# Patient Record
Sex: FEMALE | Race: BLACK OR AFRICAN AMERICAN | NOT HISPANIC OR LATINO | URBAN - METROPOLITAN AREA
[De-identification: names, ages, dates, MRNs, and addresses within clinical notes are randomized per-mention and may not be internally consistent; named-entity substitution may affect disease eponyms.]

---

## 2019-07-09 ENCOUNTER — EMERGENCY (EMERGENCY)
Facility: HOSPITAL | Age: 69
LOS: 1 days | Discharge: HOME | End: 2019-07-09
Attending: EMERGENCY MEDICINE | Admitting: EMERGENCY MEDICINE
Payer: COMMERCIAL

## 2019-07-09 VITALS
OXYGEN SATURATION: 100 % | RESPIRATION RATE: 18 BRPM | HEART RATE: 79 BPM | DIASTOLIC BLOOD PRESSURE: 64 MMHG | SYSTOLIC BLOOD PRESSURE: 158 MMHG | TEMPERATURE: 98 F

## 2019-07-09 LAB
ALBUMIN SERPL ELPH-MCNC: 3.7 G/DL — SIGNIFICANT CHANGE UP (ref 3.3–5)
ALP SERPL-CCNC: 108 U/L — SIGNIFICANT CHANGE UP (ref 40–120)
ALT FLD-CCNC: 36 U/L — HIGH (ref 4–33)
ANION GAP SERPL CALC-SCNC: 16 MMO/L — HIGH (ref 7–14)
APTT BLD: 40.5 SEC — HIGH (ref 27.5–36.3)
AST SERPL-CCNC: 20 U/L — SIGNIFICANT CHANGE UP (ref 4–32)
BASOPHILS # BLD AUTO: 0.03 K/UL — SIGNIFICANT CHANGE UP (ref 0–0.2)
BASOPHILS NFR BLD AUTO: 0.6 % — SIGNIFICANT CHANGE UP (ref 0–2)
BILIRUB SERPL-MCNC: 0.4 MG/DL — SIGNIFICANT CHANGE UP (ref 0.2–1.2)
BUN SERPL-MCNC: 17 MG/DL — SIGNIFICANT CHANGE UP (ref 7–23)
CALCIUM SERPL-MCNC: 9.7 MG/DL — SIGNIFICANT CHANGE UP (ref 8.4–10.5)
CHLORIDE SERPL-SCNC: 104 MMOL/L — SIGNIFICANT CHANGE UP (ref 98–107)
CO2 SERPL-SCNC: 18 MMOL/L — LOW (ref 22–31)
CREAT SERPL-MCNC: 0.92 MG/DL — SIGNIFICANT CHANGE UP (ref 0.5–1.3)
EOSINOPHIL # BLD AUTO: 0.1 K/UL — SIGNIFICANT CHANGE UP (ref 0–0.5)
EOSINOPHIL NFR BLD AUTO: 1.9 % — SIGNIFICANT CHANGE UP (ref 0–6)
GLUCOSE SERPL-MCNC: 87 MG/DL — SIGNIFICANT CHANGE UP (ref 70–99)
HCT VFR BLD CALC: 44.6 % — SIGNIFICANT CHANGE UP (ref 34.5–45)
HGB BLD-MCNC: 14.5 G/DL — SIGNIFICANT CHANGE UP (ref 11.5–15.5)
IMM GRANULOCYTES NFR BLD AUTO: 0.2 % — SIGNIFICANT CHANGE UP (ref 0–1.5)
INR BLD: 1.31 — HIGH (ref 0.88–1.17)
LYMPHOCYTES # BLD AUTO: 2.54 K/UL — SIGNIFICANT CHANGE UP (ref 1–3.3)
LYMPHOCYTES # BLD AUTO: 47.7 % — HIGH (ref 13–44)
MCHC RBC-ENTMCNC: 29.1 PG — SIGNIFICANT CHANGE UP (ref 27–34)
MCHC RBC-ENTMCNC: 32.5 % — SIGNIFICANT CHANGE UP (ref 32–36)
MCV RBC AUTO: 89.6 FL — SIGNIFICANT CHANGE UP (ref 80–100)
MONOCYTES # BLD AUTO: 0.31 K/UL — SIGNIFICANT CHANGE UP (ref 0–0.9)
MONOCYTES NFR BLD AUTO: 5.8 % — SIGNIFICANT CHANGE UP (ref 2–14)
NEUTROPHILS # BLD AUTO: 2.33 K/UL — SIGNIFICANT CHANGE UP (ref 1.8–7.4)
NEUTROPHILS NFR BLD AUTO: 43.8 % — SIGNIFICANT CHANGE UP (ref 43–77)
NRBC # FLD: 0 K/UL — SIGNIFICANT CHANGE UP (ref 0–0)
NT-PROBNP SERPL-SCNC: 253.3 PG/ML — SIGNIFICANT CHANGE UP
PLATELET # BLD AUTO: 363 K/UL — SIGNIFICANT CHANGE UP (ref 150–400)
PMV BLD: 9 FL — SIGNIFICANT CHANGE UP (ref 7–13)
POTASSIUM SERPL-MCNC: 4.3 MMOL/L — SIGNIFICANT CHANGE UP (ref 3.5–5.3)
POTASSIUM SERPL-SCNC: 4.3 MMOL/L — SIGNIFICANT CHANGE UP (ref 3.5–5.3)
PROT SERPL-MCNC: 7.8 G/DL — SIGNIFICANT CHANGE UP (ref 6–8.3)
PROTHROM AB SERPL-ACNC: 15 SEC — HIGH (ref 9.8–13.1)
RBC # BLD: 4.98 M/UL — SIGNIFICANT CHANGE UP (ref 3.8–5.2)
RBC # FLD: 12.8 % — SIGNIFICANT CHANGE UP (ref 10.3–14.5)
SODIUM SERPL-SCNC: 138 MMOL/L — SIGNIFICANT CHANGE UP (ref 135–145)
TROPONIN T, HIGH SENSITIVITY: 11 NG/L — SIGNIFICANT CHANGE UP (ref ?–14)
TROPONIN T, HIGH SENSITIVITY: 11 NG/L — SIGNIFICANT CHANGE UP (ref ?–14)
WBC # BLD: 5.32 K/UL — SIGNIFICANT CHANGE UP (ref 3.8–10.5)
WBC # FLD AUTO: 5.32 K/UL — SIGNIFICANT CHANGE UP (ref 3.8–10.5)

## 2019-07-09 PROCEDURE — 93308 TTE F-UP OR LMTD: CPT | Mod: 26

## 2019-07-09 PROCEDURE — 93010 ELECTROCARDIOGRAM REPORT: CPT | Mod: NC

## 2019-07-09 PROCEDURE — 71045 X-RAY EXAM CHEST 1 VIEW: CPT | Mod: 26

## 2019-07-09 PROCEDURE — 99284 EMERGENCY DEPT VISIT MOD MDM: CPT | Mod: 25

## 2019-07-09 NOTE — ED PROVIDER NOTE - PHYSICAL EXAMINATION
GEN - NAD; well appearing; A+O x3   HEAD - NC/AT     EYES - EOMI, no conjunctival pallor, no scleral icterus  ENT -   mucous membranes  moist , no discharge      NECK - Neck supple  PULM - CTA b/l,  symmetric breath sounds  COR -  RRR, S1 S2, no murmurs  ABD - , ND, NT, soft, no guarding, no rebound, no masses    BACK - no CVA tenderness, nontender spine     EXTREMS - bilateral mild edema, R>L, no deformity, warm and well perfused    SKIN - no rash or bruising      NEUROLOGIC - alert, sensation nl, motor 5/5 RUE/LUE/RLE/LLE

## 2019-07-09 NOTE — ED PROVIDER NOTE - OBJECTIVE STATEMENT
68F s/p recent diagnosis of massive bilateral PE's, R leg DVT in [place] presents for further evaluation after coming back from [place] today. Pt reports that was feeling dyspnea and lightheadedness x 2 days, went to ED in [facility] on July 4th and was diagnose with PE/DVT, trop elevated as well, EKG with right heart strain, had IVC filter placed and started on Xarelto. Pt feels much better now, but would like to be further evaluated.  Pt does not believe she had an echo at OSH.

## 2019-07-09 NOTE — ED ADULT NURSE REASSESSMENT NOTE - NS ED NURSE REASSESS COMMENT FT1
pt is a&o x 3. pt stable and comfortable. pt denies sob, chest pain, n/v, fevers, chills at this time. will continue to monitor.

## 2019-07-09 NOTE — ED PROVIDER NOTE - ATTENDING CONTRIBUTION TO CARE
Dr. Ridley: I have personally performed a face to face bedside history and physical examination of this patient. I have discussed the history, examination, review of systems, assessment and plan of management with the resident. I have reviewed the electronic medical record and amended it to reflect my history, review of systems, physical exam, assessment and plan.      see chart

## 2019-07-09 NOTE — ED PROVIDER NOTE - PLAN OF CARE
68F s/p recent diagnosis of massive bilateral PE's, R leg DVT in [place] presents for further evaluation after coming back from [facility] today. Stable and asymptomatic.  -Continue anticoagulation with xarelto  -Follow up with PCP Dr. Serra  -Currently asymptomatic, denies dyspnea or CP, bedside US does not show RH strain.

## 2019-07-09 NOTE — ED ADULT TRIAGE NOTE - CHIEF COMPLAINT QUOTE
pt recently returned from [place] [facility], states that [facility] in [facility] she was short of breath, diagnosed with multiple pulmonary embolisms and dvts, was anticoagulated and IVC filters were placed on 7/4, pt was placed on xaralto. patient denies any chest pain or shortness of breath @ present, was told by pmd to come to the hospital to make sure "everything is ok."

## 2019-07-09 NOTE — ED ADULT NURSE NOTE - CHIEF COMPLAINT QUOTE
pt recently returned from [place] and [facility], states that while in [facility] she was short of breath, diagnosed with multiple pulmonary embolisms and dvts, was anticoagulated and IVC filters were placed on 7/4, pt was placed on xaralto. patient denies any chest pain or shortness of breath @ present, was told by pmd to come to the hospital to make sure "everything is ok."

## 2019-07-09 NOTE — ED PROVIDER NOTE - CARE PLAN
Principal Discharge DX:	Pulmonary embolism without acute cor pulmonale, unspecified chronicity, unspecified pulmonary embolism type  Assessment and plan of treatment:	68F s/p recent diagnosis of massive bilateral PE's, R leg DVT in [place] presents for further evaluation after coming back from [facility] today. Stable and asymptomatic.  -Continue anticoagulation with xarelto  -Follow up with PCP Dr. Serra  -Currently asymptomatic, denies dyspnea or CP, bedside US does not show RH strain.

## 2019-07-09 NOTE — ED PROVIDER NOTE - CLINICAL SUMMARY MEDICAL DECISION MAKING FREE TEXT BOX
Havmaiarabella: 68F with recent diagnosis of massive PE's, on Xarelto s/p IVC filter, feeling improved, hemodynamically stable. EKG shows right heart strain. will check trop, BNP to eval further for right heart strain, ACS. will r/o significant anemia. Plan for labs,  then likely admission for echo, cardiology eval.

## 2019-07-09 NOTE — ED PROVIDER NOTE - PROGRESS NOTE DETAILS
[place]: labs reviewed. trop 11, bnp 220s. spoke to hospitalist about admission, but think that pt is stable to go to CDU as long as POCT echo does not show right heart strain. [place]: POCT US does not show obvious right heart strain, but limited views obtained. Spoke to CDU, there are no echo beds available and not a CDU candidate. Hospitalist repaged. Spoke with patient and family, currently patient is asymptomatic.  No evidence of RH strain on bedside US,

## 2019-07-10 VITALS
OXYGEN SATURATION: 100 % | DIASTOLIC BLOOD PRESSURE: 64 MMHG | TEMPERATURE: 98 F | HEART RATE: 78 BPM | RESPIRATION RATE: 17 BRPM | SYSTOLIC BLOOD PRESSURE: 139 MMHG

## 2020-10-06 ENCOUNTER — EMERGENCY (EMERGENCY)
Facility: HOSPITAL | Age: 70
LOS: 1 days | Discharge: HOME | End: 2020-10-06
Attending: EMERGENCY MEDICINE
Payer: COMMERCIAL

## 2020-10-06 VITALS
HEART RATE: 62 BPM | SYSTOLIC BLOOD PRESSURE: 113 MMHG | TEMPERATURE: 98 F | RESPIRATION RATE: 17 BRPM | OXYGEN SATURATION: 100 % | DIASTOLIC BLOOD PRESSURE: 67 MMHG

## 2020-10-06 VITALS
HEART RATE: 89 BPM | WEIGHT: 229.94 LBS | RESPIRATION RATE: 17 BRPM | DIASTOLIC BLOOD PRESSURE: 75 MMHG | TEMPERATURE: 98 F | HEIGHT: 68 IN | SYSTOLIC BLOOD PRESSURE: 113 MMHG | OXYGEN SATURATION: 100 %

## 2020-10-06 DIAGNOSIS — Z95.828 PRESENCE OF OTHER VASCULAR IMPLANTS AND GRAFTS: Chronic | ICD-10-CM

## 2020-10-06 PROCEDURE — 99284 EMERGENCY DEPT VISIT MOD MDM: CPT | Mod: 25

## 2020-10-06 PROCEDURE — 72131 CT LUMBAR SPINE W/O DYE: CPT | Mod: 26

## 2020-10-06 PROCEDURE — 96372 THER/PROPH/DIAG INJ SC/IM: CPT

## 2020-10-06 PROCEDURE — 99284 EMERGENCY DEPT VISIT MOD MDM: CPT

## 2020-10-06 PROCEDURE — 72131 CT LUMBAR SPINE W/O DYE: CPT

## 2020-10-06 RX ORDER — KETOROLAC TROMETHAMINE 30 MG/ML
30 SYRINGE (ML) INJECTION ONCE
Refills: 0 | Status: DISCONTINUED | OUTPATIENT
Start: 2020-10-06 | End: 2020-10-06

## 2020-10-06 RX ORDER — CYCLOBENZAPRINE HYDROCHLORIDE 10 MG/1
5 TABLET, FILM COATED ORAL ONCE
Refills: 0 | Status: COMPLETED | OUTPATIENT
Start: 2020-10-06 | End: 2020-10-06

## 2020-10-06 RX ORDER — SENNA PLUS 8.6 MG/1
2 TABLET ORAL ONCE
Refills: 0 | Status: DISCONTINUED | OUTPATIENT
Start: 2020-10-06 | End: 2020-10-06

## 2020-10-06 RX ORDER — CYCLOBENZAPRINE HYDROCHLORIDE 10 MG/1
1 TABLET, FILM COATED ORAL
Qty: 15 | Refills: 0
Start: 2020-10-06

## 2020-10-06 RX ADMIN — Medication 30 MILLIGRAM(S): at 10:30

## 2020-10-06 RX ADMIN — CYCLOBENZAPRINE HYDROCHLORIDE 5 MILLIGRAM(S): 10 TABLET, FILM COATED ORAL at 10:16

## 2020-10-06 RX ADMIN — Medication 30 MILLIGRAM(S): at 10:17

## 2020-10-06 NOTE — ED ADULT NURSE NOTE - NSIMPLEMENTINTERV_GEN_ALL_ED
Implemented All Fall Risk Interventions:  Shell to call system. Call bell, personal items and telephone within reach. Instruct patient to call for assistance. Room bathroom lighting operational. Non-slip footwear when patient is off stretcher. Physically safe environment: no spills, clutter or unnecessary equipment. Stretcher in lowest position, wheels locked, appropriate side rails in place. Provide visual cue, wrist band, yellow gown, etc. Monitor gait and stability. Monitor for mental status changes and reorient to person, place, and time. Review medications for side effects contributing to fall risk. Reinforce activity limits and safety measures with patient and family.

## 2020-10-06 NOTE — ED ADULT NURSE NOTE - ED STAT RN HANDOFF DETAILS
Patient discharged home, pain improved. Daughter at bedside. Discharge instructions provided, teach back method used. Patient verbalized understanding. Patient in no acute distress, safety maintained.

## 2020-10-06 NOTE — ED PROVIDER NOTE - PATIENT PORTAL LINK FT
You can access the FollowMyHealth Patient Portal offered by [facility] by registering at the following website: [email or web address]. By joining [facility]’s FollowMyHealth portal, you will also be able to view your health information using other applications (apps) compatible with our system.

## 2020-10-06 NOTE — ED PROVIDER NOTE - NSFOLLOWUPINSTRUCTIONS_ED_ALL_ED_FT
You were seen today after your fall. Your CAT scan did not show acute fracture. Please follow up with the orthopedic doctor on 10/8/20 at your scheduled appointment. Please take your medications as prescribed. Please return to the Emergency Department for worsening signs or symptoms.      Tailbone Injury       The tailbone (coccyx) is the small bone at the lower end of the spine. A tailbone injury may involve stretched ligaments, bruising, or a broken bone (fracture). Tailbone injuries can be painful, and some may take a long time to heal.      What are the causes?  This condition may be caused by:  •Falling and landing on the tailbone.      •Repeated strain or friction from sitting for long periods of time. This may include actions such as rowing and bicycling.      •Childbirth.      In some cases, the cause may not be known.      What are the signs or symptoms?  Symptoms of this condition include:  •Pain in the tailbone area or lower back, especially when sitting.      •Pain or difficulty when standing up from a sitting position.      •Bruising or swelling in the tailbone area.      •Painful bowel movements.      •In women, pain during intercourse.        How is this diagnosed?  This condition may be diagnosed based on:  •Your symptoms.      •A physical exam.    If your health care provider suspects a fracture, you may have additional tests, such as:  •X-rays.      •CT scan.      •MRI.        How is this treated?    Most tailbone injuries heal on their own in 4–6 weeks. However, recovery time may be longer if the injury involves a fracture.  Treatment for this condition may include:  •NSAIDs or other over-the-counter medicines to help relieve your pain.      •Using a large, rubber or inflated ring or cushion to take pressure off the tailbone when sitting.      •Physical therapy.      •Injecting the tailbone area with local anesthesia and steroid medicine. This is not normally needed unless the pain does not improve over time with over-the-counter pain medicines.        Follow these instructions at home:    Activity     •Avoid sitting for long periods of time.    •To prevent repeating an injury that is caused by strain or friction:  •Wear appropriate padding and sports gear when bicycling and rowing.        •Increase your activity as the pain allows. Perform any exercises that are recommended by your health care provider or physical therapist.      Managing pain, stiffness, and swelling   •To help decrease discomfort when sitting:   •Sit on your rubber or inflated ring or cushion as told by your health care provider.      •Lean forward when you sit.      •If directed, apply ice to the injured area:  •Put ice in a plastic bag.      •Place a towel between your skin and the bag.      •Leave the ice on for 20 minutes, 2–3 times per day for the first 1–2 days.      •If directed, apply heat to the affected area as often as told by your health care provider. Use the heat source that your health care provider recommends, such as a moist heat pack or a heating pad.  •Place a towel between your skin and the heat source.      •Leave the heat on for 20–30 minutes.      •Remove the heat if your skin turns bright red. This is especially important if you are unable to feel pain, heat, or cold. You may have a greater risk of getting burned.        General instructions     •Take over-the-counter and prescription medicines only as told by your health care provider.    •To prevent or treat constipation or painful bowel movements, your health care provider may recommend that you:  •Drink enough fluid to keep your urine pale yellow.      •Eat foods that are high in fiber, such as fresh fruits and vegetables, whole grains, and beans.      •Limit foods that are high in fat and processed sugars, such as fried and sweet foods.      •Take an over-the-counter or prescription medicine for constipation.        •Keep all follow-up visits as directed by your health care provider. This is important.        Contact a health care provider if:    •Your pain becomes worse or is not controlled with medicine.      •Your bowel movements cause a great deal of discomfort.      •You are unable to have a bowel movement after 4 days.      •You have pain during intercourse.        Summary    •A tailbone injury may involve stretched ligaments, bruising, or a broken bone (fracture).      •Tailbone injuries can be painful. Most heal on their own in 4–6 weeks.      •Treatment may include taking NSAIDs, using a rubber or inflated ring or cushion when sitting, and physical therapy.      •Follow any recommendations from your health care provider to prevent or treat constipation.      This information is not intended to replace advice given to you by your health care provider. Make sure you discuss any questions you have with your health care provider.

## 2020-10-06 NOTE — ED ADULT NURSE NOTE - CHPI ED NUR SYMPTOMS NEG
no motor function loss/no bladder dysfunction/no numbness/no anorexia/no difficulty bearing weight/no neck tenderness/no tingling

## 2020-10-06 NOTE — ED PROVIDER NOTE - CLINICAL SUMMARY MEDICAL DECISION MAKING FREE TEXT BOX
69 yo F s/p mechanical fall. Patient with fall out of bed onto floor 5 days ago. with sacral-coccyx pain now. Tenderness on exam. Outpatient UC XR reported to show possible fracture. CT here today without acute fracture. Disc herniation and spinal stenosis noted. Improved with analgesia. Rx provided for naproxen and flexeril. already has scheduled appt for ortho 10/8/20. Nontoxic and medically stable for discharge. Return precautions provided and patient understands to return to the ED for worsening signs and symptoms. Instructed to follow up with primary care physician/specialist and agreeable. Patient's questions answered and given copies of lab results.

## 2020-10-06 NOTE — ED PROVIDER NOTE - OBJECTIVE STATEMENT
71 yo female with PMHx of RLE DVT, bilateral PE, not on any anticoagulants, IVC filter in place, no PSHx, allergic to penicillin, currently taking daily Aspirin 81 mg, presents s/p fall and slip out of bed 5 days ago now with sharp, non radiating, lower back pain in the sacral region. Patient states her lower sacral pain is currently a 4/10 but at its worst it is an 8/10. Patient states she was in bed fully awake when she slid out and onto the floor hitting her bottom. She states she was able to get up on her own but she feels a sharp non radiating pain in her sacral region since the fall. Additionally she also had some difficulty walking and shortness of breath due to the pain. Patient reports going to Urgent Care on Sunday 3 days after the fall where she had an XR that showed ?fracture - she was given naproxen and advised to either go to the ED or make an appointment with ortho. Patient did make an appointment with ortho however the pain was so bad today she came to the ED instead. Patient also reports feeling constipated since the fall. She reports taking laxatives with minimal relief. She states she had a small bowel movement but she is not passing any flatus. She denies any fever, dizziness, palpitations, chest pain or any other acute complaints. 71 yo female with PMHx of RLE DVT, bilateral PE, not on any anticoagulants, IVC filter in place, no PSHx, allergic to penicillin, currently taking daily Aspirin 81 mg, presents s/p fall and slip out of bed 5 days ago now with sharp, non-radiating, lower back pain in the sacral region. Patient states her lower sacral pain is currently a 4/10 but at its worst it is an 8/10. Patient states she was in bed fully awake when she slid out and onto the floor hitting her bottom. She states she was able to get up on her own but she feels a sharp non radiating pain in her sacral region since the fall. Additionally she also had some difficulty walking and shortness of breath due to the pain. Patient reports going to Urgent Care on Sunday 3 days after the fall where she had an XR that showed a possible fracture - she was given naproxen and advised to either go to the ED or make an appointment with ortho. Patient did make an appointment with ortho however the pain was so bad today she came to the ED instead. Patient also reports feeling constipated since the fall. She reports taking laxatives with minimal relief. She states she had a small bowel movement. She denies any fever, dizziness, palpitations, chest pain, saddle anesthesia, loss of bowel or bladder or any other acute complaints.

## 2020-10-06 NOTE — ED PROVIDER NOTE - NSFOLLOWUPCLINICS_GEN_ALL_ED_FT
[facility]  Orthopedics  [address] [place] [address]  Phone: [phone]  Fax: [phone]  Follow Up Time:

## 2020-10-06 NOTE — ED ADULT NURSE NOTE - OBJECTIVE STATEMENT
Patient presents to ED with c/o lower back pain 5/10 that gets worse with movement. As per patient, she fell on Friday, at urgent care was diagnosed with "lower back and pelvic fracture". Patient denies chest pain, no difficulty breathing. Patient is noted to have dyspnea on exertion with bilateral lower extremity edema.

## 2020-10-06 NOTE — ED PROVIDER NOTE - GASTROINTESTINAL, MLM
Abdomen soft, non-tender, no guarding. Normal bowel sounds in the bilateral upper quadrants and hypoactive bowel sounds in the lower abdomen.

## 2020-11-03 PROBLEM — I82.409 ACUTE EMBOLISM AND THROMBOSIS OF UNSPECIFIED DEEP VEINS OF UNSPECIFIED LOWER EXTREMITY: Chronic | Status: ACTIVE | Noted: 2020-10-06

## 2020-11-05 ENCOUNTER — APPOINTMENT (OUTPATIENT)
Dept: VASCULAR SURGERY | Facility: CLINIC | Age: 70
End: 2020-11-05
Payer: COMMERCIAL

## 2020-11-05 DIAGNOSIS — Z01.818 ENCOUNTER FOR OTHER PREPROCEDURAL EXAMINATION: ICD-10-CM

## 2020-11-05 DIAGNOSIS — Z95.828 PRESENCE OF OTHER VASCULAR IMPLANTS AND GRAFTS: ICD-10-CM

## 2020-11-05 PROCEDURE — 93970 EXTREMITY STUDY: CPT

## 2020-11-05 PROCEDURE — 99204 OFFICE O/P NEW MOD 45 MIN: CPT

## 2020-11-09 PROBLEM — Z95.828 PRESENCE OF IVC FILTER: Status: ACTIVE | Noted: 2020-11-09

## 2020-11-09 PROBLEM — Z01.818 PRE-OP EVALUATION: Status: ACTIVE | Noted: 2020-11-05

## 2020-11-09 NOTE — ASSESSMENT
[FreeTextEntry1] : 70 year old female without significant PMH presenting to the office for evaluation of DVT and IVCF placement. US done today showing IVC and bilateral iliac thrombus. Her legs are both swollen, which she states is her baseline for the most part, no wounds. Most likely when she went off her AC for a few months and developed BLE DVT that traveled to the IVC filter and occluded the filter and both iliacs. Had a long discussion about her options. Recommend venogram and thrombectomy/lysis, patient does not want to proceed at this time, she will go home and think about it. She understands that the longer she waits the less likely the surgery will be successful due to chronic nature of the clot, and might have chronic leg swelling and wounds. She understands and will reach out to us next week wether or not she wants to proceed with surgery.

## 2020-11-09 NOTE — HISTORY OF PRESENT ILLNESS
[FreeTextEntry1] : 70 year old female without significant PMH presenting to the office for evaluation of chronic DVT. Back in July 2019 she traveled to [facility] upon arrival she was having some SOB and she went to the ED there and she was found to have right leg DVT and PE.They placed an IVCF and started her on Xarelto. She was admitted for a few days and discharged home with clearance to fly as long as she went straight to the ED upon return which she did. She went to [facility] and was there for a few days and deemed stable for discharge and advised to continue the Xarelto and FU with PCP.  She was on the Xarelto until August 2020 and her hematologist ([facility]- Dr. Vazquez) advised her to stop the medication. She was doing well, no issues. October 2 she fell off the bed and injured her back and went to an urgent care, she had an US which showed bilateral DVT and she was restarted back on her Xarelto. \par \par No family history of DVT\par Not currently smoking\par No OCP/HRT\par

## 2020-11-09 NOTE — PHYSICAL EXAM
[Respiratory Effort] : normal respiratory effort [Normal Heart Sounds] : normal heart sounds [2+] : left 2+ [Ankle Swelling (On Exam)] : present [Ankle Swelling Bilaterally] : bilaterally  [Ankle Swelling On The Left] : moderate [Calm] : calm [Varicose Veins Of Lower Extremities] : not present [] : not present [de-identified] : using walker, NAD [de-identified] : no ulcers

## 2020-11-09 NOTE — PROCEDURE
[FreeTextEntry1] : US done showing IVC and bilateral iliac veins with thrombus, minimal flow, no DVT below.

## 2020-11-17 ENCOUNTER — TRANSCRIPTION ENCOUNTER (OUTPATIENT)
Age: 70
End: 2020-11-17

## 2020-11-18 ENCOUNTER — APPOINTMENT (OUTPATIENT)
Dept: VASCULAR SURGERY | Facility: HOSPITAL | Age: 70
End: 2020-11-18

## 2020-11-18 ENCOUNTER — INPATIENT (INPATIENT)
Facility: HOSPITAL | Age: 70
LOS: 1 days | Discharge: HOME HEALTH | DRG: 271 | End: 2020-11-20
Attending: STUDENT IN AN ORGANIZED HEALTH CARE EDUCATION/TRAINING PROGRAM | Admitting: STUDENT IN AN ORGANIZED HEALTH CARE EDUCATION/TRAINING PROGRAM
Payer: COMMERCIAL

## 2020-11-18 ENCOUNTER — TRANSCRIPTION ENCOUNTER (OUTPATIENT)
Age: 70
End: 2020-11-18

## 2020-11-18 VITALS
WEIGHT: 229.5 LBS | RESPIRATION RATE: 16 BRPM | SYSTOLIC BLOOD PRESSURE: 148 MMHG | OXYGEN SATURATION: 100 % | HEIGHT: 68 IN | TEMPERATURE: 98 F | DIASTOLIC BLOOD PRESSURE: 78 MMHG | HEART RATE: 56 BPM

## 2020-11-18 DIAGNOSIS — Z98.891 HISTORY OF UTERINE SCAR FROM PREVIOUS SURGERY: Chronic | ICD-10-CM

## 2020-11-18 DIAGNOSIS — I80.219 PHLEBITIS AND THROMBOPHLEBITIS OF UNSPECIFIED ILIAC VEIN: ICD-10-CM

## 2020-11-18 DIAGNOSIS — Z95.828 PRESENCE OF OTHER VASCULAR IMPLANTS AND GRAFTS: Chronic | ICD-10-CM

## 2020-11-18 LAB
ANION GAP SERPL CALC-SCNC: 11 MMOL/L — SIGNIFICANT CHANGE UP (ref 5–17)
APTT BLD: 39.6 SEC — HIGH (ref 27.5–35.5)
BUN SERPL-MCNC: 13 MG/DL — SIGNIFICANT CHANGE UP (ref 7–23)
CALCIUM SERPL-MCNC: 9.5 MG/DL — SIGNIFICANT CHANGE UP (ref 8.4–10.5)
CHLORIDE SERPL-SCNC: 107 MMOL/L — SIGNIFICANT CHANGE UP (ref 96–108)
CO2 SERPL-SCNC: 25 MMOL/L — SIGNIFICANT CHANGE UP (ref 22–31)
CREAT SERPL-MCNC: 0.98 MG/DL — SIGNIFICANT CHANGE UP (ref 0.5–1.3)
FIBRINOGEN PPP-MCNC: 116 MG/DL — LOW (ref 258–438)
FIBRINOGEN PPP-MCNC: 276 MG/DL — SIGNIFICANT CHANGE UP (ref 258–438)
GLUCOSE BLDC GLUCOMTR-MCNC: 155 MG/DL — HIGH (ref 70–99)
GLUCOSE SERPL-MCNC: 93 MG/DL — SIGNIFICANT CHANGE UP (ref 70–99)
HCT VFR BLD CALC: 40.6 % — SIGNIFICANT CHANGE UP (ref 34.5–45)
HGB BLD-MCNC: 12.8 G/DL — SIGNIFICANT CHANGE UP (ref 11.5–15.5)
INR BLD: 1.21 — HIGH (ref 0.88–1.16)
MAGNESIUM SERPL-MCNC: 2.4 MG/DL — SIGNIFICANT CHANGE UP (ref 1.6–2.6)
MCHC RBC-ENTMCNC: 28.6 PG — SIGNIFICANT CHANGE UP (ref 27–34)
MCHC RBC-ENTMCNC: 31.5 GM/DL — LOW (ref 32–36)
MCV RBC AUTO: 90.8 FL — SIGNIFICANT CHANGE UP (ref 80–100)
NRBC # BLD: 0 /100 WBCS — SIGNIFICANT CHANGE UP (ref 0–0)
PHOSPHATE SERPL-MCNC: 3.6 MG/DL — SIGNIFICANT CHANGE UP (ref 2.5–4.5)
PLATELET # BLD AUTO: 238 K/UL — SIGNIFICANT CHANGE UP (ref 150–400)
POTASSIUM SERPL-MCNC: 4.2 MMOL/L — SIGNIFICANT CHANGE UP (ref 3.5–5.3)
POTASSIUM SERPL-SCNC: 4.2 MMOL/L — SIGNIFICANT CHANGE UP (ref 3.5–5.3)
PROTHROM AB SERPL-ACNC: 14.4 SEC — HIGH (ref 10.6–13.6)
RBC # BLD: 4.47 M/UL — SIGNIFICANT CHANGE UP (ref 3.8–5.2)
RBC # FLD: 13.7 % — SIGNIFICANT CHANGE UP (ref 10.3–14.5)
SODIUM SERPL-SCNC: 143 MMOL/L — SIGNIFICANT CHANGE UP (ref 135–145)
WBC # BLD: 4.35 K/UL — SIGNIFICANT CHANGE UP (ref 3.8–10.5)
WBC # FLD AUTO: 4.35 K/UL — SIGNIFICANT CHANGE UP (ref 3.8–10.5)

## 2020-11-18 PROCEDURE — 99232 SBSQ HOSP IP/OBS MODERATE 35: CPT

## 2020-11-18 PROCEDURE — 37212 THROMBOLYTIC VENOUS THERAPY: CPT | Mod: GC

## 2020-11-18 RX ORDER — GLUCAGON INJECTION, SOLUTION 0.5 MG/.1ML
1 INJECTION, SOLUTION SUBCUTANEOUS ONCE
Refills: 0 | Status: DISCONTINUED | OUTPATIENT
Start: 2020-11-18 | End: 2020-11-20

## 2020-11-18 RX ORDER — DEXTROSE 50 % IN WATER 50 %
25 SYRINGE (ML) INTRAVENOUS ONCE
Refills: 0 | Status: DISCONTINUED | OUTPATIENT
Start: 2020-11-18 | End: 2020-11-20

## 2020-11-18 RX ORDER — ALTEPLASE 100 MG
0.25 KIT INTRAVENOUS
Qty: 10 | Refills: 0 | Status: DISCONTINUED | OUTPATIENT
Start: 2020-11-18 | End: 2020-11-19

## 2020-11-18 RX ORDER — ALTEPLASE 100 MG
0.5 KIT INTRAVENOUS
Qty: 10 | Refills: 0 | Status: DISCONTINUED | OUTPATIENT
Start: 2020-11-18 | End: 2020-11-18

## 2020-11-18 RX ORDER — HYDRALAZINE HCL 50 MG
10 TABLET ORAL ONCE
Refills: 0 | Status: COMPLETED | OUTPATIENT
Start: 2020-11-18 | End: 2020-11-18

## 2020-11-18 RX ORDER — SODIUM CHLORIDE 9 MG/ML
1000 INJECTION INTRAMUSCULAR; INTRAVENOUS; SUBCUTANEOUS
Refills: 0 | Status: DISCONTINUED | OUTPATIENT
Start: 2020-11-18 | End: 2020-11-20

## 2020-11-18 RX ORDER — RIVAROXABAN 15 MG-20MG
1 KIT ORAL
Qty: 0 | Refills: 0 | DISCHARGE

## 2020-11-18 RX ORDER — SODIUM CHLORIDE 9 MG/ML
1000 INJECTION, SOLUTION INTRAVENOUS
Refills: 0 | Status: DISCONTINUED | OUTPATIENT
Start: 2020-11-18 | End: 2020-11-20

## 2020-11-18 RX ORDER — INSULIN LISPRO 100/ML
VIAL (ML) SUBCUTANEOUS EVERY 6 HOURS
Refills: 0 | Status: DISCONTINUED | OUTPATIENT
Start: 2020-11-18 | End: 2020-11-20

## 2020-11-18 RX ORDER — DEXTROSE 50 % IN WATER 50 %
12.5 SYRINGE (ML) INTRAVENOUS ONCE
Refills: 0 | Status: DISCONTINUED | OUTPATIENT
Start: 2020-11-18 | End: 2020-11-20

## 2020-11-18 RX ORDER — ACETAMINOPHEN 500 MG
1000 TABLET ORAL EVERY 6 HOURS
Refills: 0 | Status: DISCONTINUED | OUTPATIENT
Start: 2020-11-18 | End: 2020-11-20

## 2020-11-18 RX ORDER — HEPARIN SODIUM 5000 [USP'U]/ML
500 INJECTION INTRAVENOUS; SUBCUTANEOUS
Qty: 25000 | Refills: 0 | Status: DISCONTINUED | OUTPATIENT
Start: 2020-11-18 | End: 2020-11-19

## 2020-11-18 RX ORDER — DEXTROSE 50 % IN WATER 50 %
15 SYRINGE (ML) INTRAVENOUS ONCE
Refills: 0 | Status: DISCONTINUED | OUTPATIENT
Start: 2020-11-18 | End: 2020-11-20

## 2020-11-18 RX ADMIN — Medication 10 MILLIGRAM(S): at 18:53

## 2020-11-18 RX ADMIN — Medication 1000 MILLIGRAM(S): at 19:00

## 2020-11-18 RX ADMIN — Medication 1000 MILLIGRAM(S): at 19:29

## 2020-11-18 RX ADMIN — Medication 2: at 23:35

## 2020-11-18 NOTE — CONSULT NOTE ADULT - ASSESSMENT
A/P: 70F no PMH, RLE DVT and PE in July2019, s/p IVC filter and xarelto until Aug 2020, restarted xarelto October, outpatient US found occluded IVC filter with thrombosed bilateral iliac veins, here for elective venogram, which showed chronically occluded bilateral iliac veins, IVC, s/p placement of catheter for direct tpa thrombolysis, transferred to SICU for close monitoring while getting continuous tpa gtts.     NEURO:  CV:  PULM:   GI/FEN:  :  ENDO: ISS  ID:  PPX: SCDs  LINES: PIVs,   WOUNDS/DRAINS:        A/P: 70F no PMH, RLE DVT and PE in July2019, s/p IVC filter and xarelto until Aug 2020, restarted xarelto October, outpatient US found occluded IVC filter with thrombosed bilateral iliac veins, here for elective venogram, which showed chronically occluded bilateral iliac veins, IVC, s/p placement of catheter for direct tpa thrombolysis, transferred to SICU for close monitoring while getting continuous tpa gtts.     NEURO: neuro checks q1h, avoid narcotics.   CV: stable.   VASC: tpa 0.5mg/hr via each catheter, heparin 500 U via PIV  PULM: room air.   GI/FEN: CLD, NPO after MN for repeat venogram, NS@100.   : voids.   ENDO: ISS  ID: no issues  PPX: no SCDs,   LINES: PIVs, tpa catheter  WOUNDS/DRAINS: bilateral groin puncture.          A/P: 70F no PMH, RLE DVT and PE in July2019, s/p IVC filter and xarelto until Aug 2020, restarted xarelto October, outpatient US found occluded IVC filter with thrombosed bilateral iliac veins, here for elective venogram, which showed chronically occluded bilateral iliac veins, IVC, s/p placement of catheter for direct tpa thrombolysis, transferred to SICU for close monitoring while getting continuous tpa gtts.     NEURO: neuro checks q1h, avoid narcotics.   CV: stable.   VASC: tpa 0.5mg/hr via each catheter, heparin 500 U via PIV, serilal fibrinogen.   PULM: room air. no resp distress.   GI/FEN: CLD, NPO after MN for repeat venogram, NS@100.   : voids.   ENDO: ISS  ID: no issues  PPX: no SCDs,   LINES: PIVs, tpa catheter  WOUNDS/DRAINS: bilateral groin puncture.

## 2020-11-18 NOTE — H&P ADULT - ASSESSMENT
71 yo F with h/o DVT/PE (h/o IVC filter), on Xarelto 20 mg po qd currently. Been having worsening bilateral lower extremity swelling and pain lately. Office duplex shows occluded IVC filter with thrombosed bilateral iliac veins. She presents today for elective venogram with thrombectomy vs thrombolysis.     Last dose of Xarelto on 11/17/2020 at night.

## 2020-11-18 NOTE — BRIEF OPERATIVE NOTE - OPERATION/FINDINGS
R saphenous vein access with 5F sheath, L saphenous vein access with 6F sheath. Venogram showed chronically occluded bilateral iliac veins, IVC, and filter in IVC. Eliel catheter 30 cm infusion length placed from R groin access to above IVC filter, and Eliel infusion catheter (30 cm infusion length) placed from L groin access into IVC filter. Gave 2 mg tPA bolus into each catheter. Sheaths/catheters secured in place.

## 2020-11-18 NOTE — CONSULT NOTE ADULT - ATTENDING COMMENTS
This patient was evaluated with the resident and PA, management decisions were made, see above for the details, I agree with the A/P.  Ms. Vilsaint is a 69 y/o female with PE, RLE DVT withe IVC filter and was on and off xaralto c/b iliac veins and IVC filter thrombosis s/p catheter placement, tPA insusion and on heparin gtt.  See above for the details as d/w the resident

## 2020-11-18 NOTE — H&P ADULT - NSICDXPASTMEDICALHX_GEN_ALL_CORE_FT
PAST MEDICAL HISTORY:  DVT (deep venous thrombosis) RLE    PE (pulmonary thromboembolism) bilateral 2019    Psoriasis

## 2020-11-18 NOTE — CONSULT NOTE ADULT - SUBJECTIVE AND OBJECTIVE BOX
70F no PMH, RLE DVT and PE after travel to [place] in July2019, s/p IVC filter and xarelto until Aug 2020. pt was seeing heme Dr Vazquez at [facility] who advised her to stop xarelto. pt s/p fall in October 2020, presented to Urgent care w/ US showing bilateral DVT, so she was restarted on xarelto. Been having worsening bilateral lower extremity swelling and pain lately. Office duplex shows occluded IVC filter with thrombosed bilateral iliac veins. She presents today for elective venogram with thrombectomy vs thrombolysis. Last dose of Xarelto on 11/17/2020 at night.    PMH: as above.  HOME MEDS: xarelto (last dose 11/17 night),      70F no PMH, RLE DVT and PE after travel to [facility] in July2019, s/p IVC filter and xarelto until Aug 2020. pt was seeing heme Dr Vazquez at [facility] who advised her to stop xarelto. pt s/p fall in October 2020, presented to Urgent care w/ US showing bilateral DVT, so she was restarted on xarelto. pt had been having worsening bilateral lower extremity swelling and pain lately. Office duplex shows occluded IVC filter with thrombosed bilateral iliac veins. She presents today for elective venogram with thrombectomy vs thrombolysis. Last dose of Xarelto on 11/17/2020 at night.  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 transferred to SICU post-op for close monitoring while getting local TPA gtts.     PMH: as above. psoriasis, c section.   HOME MEDS: xarelto (last dose 11/17 night),          70F no PMH, RLE DVT and PE after travel to [facility] in July2019, s/p IVC filter and xarelto until Aug 2020. pt was seeing heme Dr Vazquez at [facility] who advised her to stop xarelto. pt s/p fall in October 2020, presented to Urgent care w/ US showing bilateral DVT, so she was restarted on xarelto. pt had been having worsening bilateral lower extremity swelling and pain lately. Office duplex shows occluded IVC filter with thrombosed bilateral iliac veins. She presents today for elective venogram with thrombectomy vs thrombolysis. Last dose of Xarelto on 11/17/2020 at night.  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 transferred to SICU post-op for close monitoring while getting local TPA gtts.     PMH: as above. psoriasis, c section.   HOME MEDS: xarelto (last dose 11/17 night),     ICU Vital Signs Last 24 Hrs  T(C): 36.5 (18 Nov 2020 11:05), Max: 36.5 (18 Nov 2020 11:05)  T(F): 97.7 (18 Nov 2020 11:05), Max: 97.7 (18 Nov 2020 11:05)  HR: 56 (18 Nov 2020 11:05) (56 - 56)  BP: 148/78 (18 Nov 2020 11:05) (148/78 - 148/78)  BP(mean): --  ABP: --  ABP(mean): --  RR: 16 (18 Nov 2020 11:05) (16 - 16)  SpO2: 100% (18 Nov 2020 11:05) (100% - 100%)             70F no PMH, RLE DVT and PE after travel to [facility] in July2019, s/p IVC filter and xarelto until Aug 2020. pt was seeing heme Dr Vazquez at [facility] who advised her to stop xarelto. pt s/p fall in October 2020, presented to Urgent care w/ US showing bilateral DVT, so she was restarted on xarelto. pt had been having worsening bilateral lower extremity swelling and pain lately. Office duplex shows occluded IVC filter with thrombosed bilateral iliac veins. She presents today for elective venogram with thrombectomy vs thrombolysis. Last dose of Xarelto on 11/17/2020 at night.  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 transferred to SICU post-op for close monitoring while getting local TPA gtts.     PMH: as above. psoriasis, c section.   HOME MEDS: xarelto (last dose 11/17 night),     ICU Vital Signs Last 24 Hrs  T(C): 36.5 (18 Nov 2020 11:05), Max: 36.5 (18 Nov 2020 11:05)  T(F): 97.7 (18 Nov 2020 11:05), Max: 97.7 (18 Nov 2020 11:05)  HR: 56 (18 Nov 2020 11:05) (56 - 56)  BP: 148/78 (18 Nov 2020 11:05) (148/78 - 148/78)  BP(mean): --  ABP: --  ABP(mean): --  RR: 16 (18 Nov 2020 11:05) (16 - 16)  SpO2: 100% (18 Nov 2020 11:05) (100% - 100%)                             70F no PMH, RLE DVT and PE after travel to [place] in July2019, s/p IVC filter and xarelto until Aug 2020. pt was seeing heme Dr Vazquez at [facility] who advised her to stop xarelto. pt s/p fall in October 2020, presented to Urgent care w/ US showing bilateral DVT, so she was restarted on xarelto. pt had been having worsening bilateral lower extremity swelling and pain lately. Office duplex shows occluded IVC filter with thrombosed bilateral iliac veins. She presents today for elective venogram with thrombectomy vs thrombolysis. Last dose of Xarelto on 11/17/2020 at night.  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 transferred to SICU post-op for close monitoring while getting local TPA gtts.     PMH: as above. psoriasis, c section.   HOME MEDS: xarelto (last dose 11/17 night),     ICU Vital Signs Last 24 Hrs  T(C): 36.5 (18 Nov 2020 11:05), Max: 36.5 (18 Nov 2020 11:05)  T(F): 97.7 (18 Nov 2020 11:05), Max: 97.7 (18 Nov 2020 11:05)  HR: 56 (18 Nov 2020 11:05) (56 - 56)  BP: 148/78 (18 Nov 2020 11:05) (148/78 - 148/78)  BP(mean): --  ABP: --  ABP(mean): --  RR: 16 (18 Nov 2020 11:05) (16 - 16)  SpO2: 100% (18 Nov 2020 11:05) (100% - 100%)    PHYSICAL EXAM:   Neurological: AAOx3, no focal deficits   Cardiovascular: RRR  Respiratory: CTA  Gastrointestinal: soft, NT, ND  Extremities: slightly cool to touch bilaterally, no dependent edema  Vascular: no cyanosis/erythema, palpable DP pulses bilaterally, bilateral infusion catheters in place, bilateral groins soft    LABS:    11-18    143  |  107  |  13  ----------------------------<  93  4.2   |  25  |  0.98    Ca    9.5      18 Nov 2020 18:08  Phos  3.6     11-18  Mg     2.4     11-18                          12.8   4.35  )-----------( 238      ( 18 Nov 2020 18:08 )             40.6   PT/INR - ( 18 Nov 2020 18:08 )   PT: 14.4 sec;   INR: 1.21          PTT - ( 18 Nov 2020 18:08 )  PTT:39.6 sec  CAPILLARY BLOOD GLUCOSE

## 2020-11-19 ENCOUNTER — TRANSCRIPTION ENCOUNTER (OUTPATIENT)
Age: 70
End: 2020-11-19

## 2020-11-19 LAB
A1C WITH ESTIMATED AVERAGE GLUCOSE RESULT: 5.7 % — HIGH (ref 4–5.6)
ANION GAP SERPL CALC-SCNC: 11 MMOL/L — SIGNIFICANT CHANGE UP (ref 5–17)
ANION GAP SERPL CALC-SCNC: 13 MMOL/L — SIGNIFICANT CHANGE UP (ref 5–17)
APTT BLD: 43.5 SEC — HIGH (ref 27.5–35.5)
APTT BLD: >200 SEC — CRITICAL HIGH (ref 27.5–35.5)
APTT BLD: >200 SEC — CRITICAL HIGH (ref 27.5–35.5)
BUN SERPL-MCNC: 10 MG/DL — SIGNIFICANT CHANGE UP (ref 7–23)
BUN SERPL-MCNC: 14 MG/DL — SIGNIFICANT CHANGE UP (ref 7–23)
CALCIUM SERPL-MCNC: 8.2 MG/DL — LOW (ref 8.4–10.5)
CALCIUM SERPL-MCNC: 9 MG/DL — SIGNIFICANT CHANGE UP (ref 8.4–10.5)
CHLORIDE SERPL-SCNC: 106 MMOL/L — SIGNIFICANT CHANGE UP (ref 96–108)
CHLORIDE SERPL-SCNC: 109 MMOL/L — HIGH (ref 96–108)
CO2 SERPL-SCNC: 18 MMOL/L — LOW (ref 22–31)
CO2 SERPL-SCNC: 21 MMOL/L — LOW (ref 22–31)
CREAT SERPL-MCNC: 0.83 MG/DL — SIGNIFICANT CHANGE UP (ref 0.5–1.3)
CREAT SERPL-MCNC: 0.85 MG/DL — SIGNIFICANT CHANGE UP (ref 0.5–1.3)
ESTIMATED AVERAGE GLUCOSE: 117 MG/DL — HIGH (ref 68–114)
FIBRINOGEN PPP-MCNC: 117 MG/DL — LOW (ref 258–438)
FIBRINOGEN PPP-MCNC: 95 MG/DL — CRITICAL LOW (ref 258–438)
FIBRINOGEN PPP-MCNC: <80 MG/DL — CRITICAL LOW (ref 258–438)
GLUCOSE BLDC GLUCOMTR-MCNC: 73 MG/DL — SIGNIFICANT CHANGE UP (ref 70–99)
GLUCOSE BLDC GLUCOMTR-MCNC: 88 MG/DL — SIGNIFICANT CHANGE UP (ref 70–99)
GLUCOSE BLDC GLUCOMTR-MCNC: 99 MG/DL — SIGNIFICANT CHANGE UP (ref 70–99)
GLUCOSE SERPL-MCNC: 100 MG/DL — HIGH (ref 70–99)
GLUCOSE SERPL-MCNC: 98 MG/DL — SIGNIFICANT CHANGE UP (ref 70–99)
HCT VFR BLD CALC: 36.3 % — SIGNIFICANT CHANGE UP (ref 34.5–45)
HCT VFR BLD CALC: 37.8 % — SIGNIFICANT CHANGE UP (ref 34.5–45)
HGB BLD-MCNC: 11.8 G/DL — SIGNIFICANT CHANGE UP (ref 11.5–15.5)
HGB BLD-MCNC: 12.4 G/DL — SIGNIFICANT CHANGE UP (ref 11.5–15.5)
MAGNESIUM SERPL-MCNC: 2 MG/DL — SIGNIFICANT CHANGE UP (ref 1.6–2.6)
MAGNESIUM SERPL-MCNC: 2.4 MG/DL — SIGNIFICANT CHANGE UP (ref 1.6–2.6)
MCHC RBC-ENTMCNC: 28.8 PG — SIGNIFICANT CHANGE UP (ref 27–34)
MCHC RBC-ENTMCNC: 29 PG — SIGNIFICANT CHANGE UP (ref 27–34)
MCHC RBC-ENTMCNC: 32.5 GM/DL — SIGNIFICANT CHANGE UP (ref 32–36)
MCHC RBC-ENTMCNC: 32.8 GM/DL — SIGNIFICANT CHANGE UP (ref 32–36)
MCV RBC AUTO: 88.5 FL — SIGNIFICANT CHANGE UP (ref 80–100)
MCV RBC AUTO: 88.5 FL — SIGNIFICANT CHANGE UP (ref 80–100)
NRBC # BLD: 0 /100 WBCS — SIGNIFICANT CHANGE UP (ref 0–0)
NRBC # BLD: 0 /100 WBCS — SIGNIFICANT CHANGE UP (ref 0–0)
PHOSPHATE SERPL-MCNC: 3.2 MG/DL — SIGNIFICANT CHANGE UP (ref 2.5–4.5)
PHOSPHATE SERPL-MCNC: 3.3 MG/DL — SIGNIFICANT CHANGE UP (ref 2.5–4.5)
PLATELET # BLD AUTO: 170 K/UL — SIGNIFICANT CHANGE UP (ref 150–400)
PLATELET # BLD AUTO: 209 K/UL — SIGNIFICANT CHANGE UP (ref 150–400)
POTASSIUM SERPL-MCNC: 3.4 MMOL/L — LOW (ref 3.5–5.3)
POTASSIUM SERPL-MCNC: 4 MMOL/L — SIGNIFICANT CHANGE UP (ref 3.5–5.3)
POTASSIUM SERPL-SCNC: 3.4 MMOL/L — LOW (ref 3.5–5.3)
POTASSIUM SERPL-SCNC: 4 MMOL/L — SIGNIFICANT CHANGE UP (ref 3.5–5.3)
RBC # BLD: 4.1 M/UL — SIGNIFICANT CHANGE UP (ref 3.8–5.2)
RBC # BLD: 4.27 M/UL — SIGNIFICANT CHANGE UP (ref 3.8–5.2)
RBC # FLD: 13.5 % — SIGNIFICANT CHANGE UP (ref 10.3–14.5)
RBC # FLD: 13.7 % — SIGNIFICANT CHANGE UP (ref 10.3–14.5)
SODIUM SERPL-SCNC: 137 MMOL/L — SIGNIFICANT CHANGE UP (ref 135–145)
SODIUM SERPL-SCNC: 141 MMOL/L — SIGNIFICANT CHANGE UP (ref 135–145)
WBC # BLD: 5.87 K/UL — SIGNIFICANT CHANGE UP (ref 3.8–10.5)
WBC # BLD: 6.33 K/UL — SIGNIFICANT CHANGE UP (ref 3.8–10.5)
WBC # FLD AUTO: 5.87 K/UL — SIGNIFICANT CHANGE UP (ref 3.8–10.5)
WBC # FLD AUTO: 6.33 K/UL — SIGNIFICANT CHANGE UP (ref 3.8–10.5)

## 2020-11-19 PROCEDURE — 37249 TRLUML BALO ANGIOP ADDL VEIN: CPT | Mod: GC

## 2020-11-19 PROCEDURE — 99233 SBSQ HOSP IP/OBS HIGH 50: CPT | Mod: GC

## 2020-11-19 PROCEDURE — 93306 TTE W/DOPPLER COMPLETE: CPT | Mod: 26

## 2020-11-19 PROCEDURE — 37187 VENOUS MECH THROMBECTOMY: CPT | Mod: GC

## 2020-11-19 PROCEDURE — 37248 TRLUML BALO ANGIOP 1ST VEIN: CPT | Mod: GC

## 2020-11-19 PROCEDURE — 37214 CESSJ THERAPY CATH REMOVAL: CPT | Mod: 59,GC

## 2020-11-19 RX ORDER — POTASSIUM CHLORIDE 20 MEQ
40 PACKET (EA) ORAL EVERY 4 HOURS
Refills: 0 | Status: DISCONTINUED | OUTPATIENT
Start: 2020-11-19 | End: 2020-11-19

## 2020-11-19 RX ORDER — HEPARIN SODIUM 5000 [USP'U]/ML
500 INJECTION INTRAVENOUS; SUBCUTANEOUS
Qty: 25000 | Refills: 0 | Status: DISCONTINUED | OUTPATIENT
Start: 2020-11-19 | End: 2020-11-19

## 2020-11-19 RX ORDER — HEPARIN SODIUM 5000 [USP'U]/ML
900 INJECTION INTRAVENOUS; SUBCUTANEOUS
Qty: 25000 | Refills: 0 | Status: DISCONTINUED | OUTPATIENT
Start: 2020-11-19 | End: 2020-11-19

## 2020-11-19 RX ORDER — CHLORHEXIDINE GLUCONATE 213 G/1000ML
1 SOLUTION TOPICAL DAILY
Refills: 0 | Status: DISCONTINUED | OUTPATIENT
Start: 2020-11-19 | End: 2020-11-20

## 2020-11-19 RX ORDER — ALTEPLASE 100 MG
0.5 KIT INTRAVENOUS
Qty: 10 | Refills: 0 | Status: DISCONTINUED | OUTPATIENT
Start: 2020-11-19 | End: 2020-11-19

## 2020-11-19 RX ORDER — HEPARIN SODIUM 5000 [USP'U]/ML
1800 INJECTION INTRAVENOUS; SUBCUTANEOUS
Qty: 25000 | Refills: 0 | Status: DISCONTINUED | OUTPATIENT
Start: 2020-11-19 | End: 2020-11-20

## 2020-11-19 RX ORDER — POTASSIUM CHLORIDE 20 MEQ
40 PACKET (EA) ORAL EVERY 4 HOURS
Refills: 0 | Status: COMPLETED | OUTPATIENT
Start: 2020-11-19 | End: 2020-11-20

## 2020-11-19 RX ADMIN — SODIUM CHLORIDE 100 MILLILITER(S): 9 INJECTION INTRAMUSCULAR; INTRAVENOUS; SUBCUTANEOUS at 04:52

## 2020-11-19 RX ADMIN — Medication 40 MILLIEQUIVALENT(S): at 21:21

## 2020-11-19 RX ADMIN — HEPARIN SODIUM 9 UNIT(S)/HR: 5000 INJECTION INTRAVENOUS; SUBCUTANEOUS at 04:05

## 2020-11-19 RX ADMIN — HEPARIN SODIUM 9 UNIT(S)/HR: 5000 INJECTION INTRAVENOUS; SUBCUTANEOUS at 04:15

## 2020-11-19 RX ADMIN — HEPARIN SODIUM 18 UNIT(S)/HR: 5000 INJECTION INTRAVENOUS; SUBCUTANEOUS at 21:14

## 2020-11-19 RX ADMIN — Medication 1000 MILLIGRAM(S): at 06:16

## 2020-11-19 RX ADMIN — ALTEPLASE 2.5 MG/HR: KIT at 01:26

## 2020-11-19 RX ADMIN — Medication 1000 MILLIGRAM(S): at 16:20

## 2020-11-19 RX ADMIN — ALTEPLASE 2.5 MG/HR: KIT at 01:25

## 2020-11-19 RX ADMIN — Medication 1000 MILLIGRAM(S): at 07:00

## 2020-11-19 RX ADMIN — Medication 1000 MILLIGRAM(S): at 17:00

## 2020-11-19 NOTE — BRIEF OPERATIVE NOTE - NSICDXBRIEFPOSTOP_GEN_ALL_CORE_FT
POST-OP DIAGNOSIS:  DVT, lower extremity 18-Nov-2020 17:26:57  Ronnie Bolton  
POST-OP DIAGNOSIS:  DVT, lower extremity 18-Nov-2020 17:26:57  Ronnie Bolton

## 2020-11-19 NOTE — PROGRESS NOTE ADULT - SUBJECTIVE AND OBJECTIVE BOX
ON: got hydralazine for HTN, TPA was Dc'D because of low fibrinogen and was switched with heparin into the 2 cath instead    Subjective:  Patient feels better today, still complaints of BLE swelling, pain is under control  NPO past midnight for relook venogram      MEDICATIONS  (STANDING):  chlorhexidine 2% Cloths 1 Application(s) Topical daily  dextrose 40% Gel 15 Gram(s) Oral once  dextrose 5%. 1000 milliLiter(s) (50 mL/Hr) IV Continuous <Continuous>  dextrose 5%. 1000 milliLiter(s) (100 mL/Hr) IV Continuous <Continuous>  dextrose 50% Injectable 25 Gram(s) IV Push once  dextrose 50% Injectable 12.5 Gram(s) IV Push once  dextrose 50% Injectable 25 Gram(s) IV Push once  glucagon  Injectable 1 milliGRAM(s) IntraMuscular once  heparin  Infusion 900 Unit(s)/Hr (9 mL/Hr) IV Continuous <Continuous>  heparin  Infusion 900 Unit(s)/Hr (9 mL/Hr) IV Continuous <Continuous>  insulin lispro (ADMELOG) corrective regimen sliding scale   SubCutaneous every 6 hours  sodium chloride 0.9%. 1000 milliLiter(s) (100 mL/Hr) IV Continuous <Continuous>    MEDICATIONS  (PRN):  acetaminophen   Tablet .. 1000 milliGRAM(s) Oral every 6 hours PRN Moderate Pain (4 - 6)    acetaminophen   Tablet .. 1000 milliGRAM(s) Oral every 6 hours PRN  chlorhexidine 2% Cloths 1 Application(s) Topical daily  dextrose 40% Gel 15 Gram(s) Oral once  dextrose 5%. 1000 milliLiter(s) IV Continuous <Continuous>  dextrose 5%. 1000 milliLiter(s) IV Continuous <Continuous>  dextrose 50% Injectable 25 Gram(s) IV Push once  dextrose 50% Injectable 12.5 Gram(s) IV Push once  dextrose 50% Injectable 25 Gram(s) IV Push once  glucagon  Injectable 1 milliGRAM(s) IntraMuscular once  heparin  Infusion 900 Unit(s)/Hr IV Continuous <Continuous>  heparin  Infusion 900 Unit(s)/Hr IV Continuous <Continuous>  insulin lispro (ADMELOG) corrective regimen sliding scale   SubCutaneous every 6 hours  sodium chloride 0.9%. 1000 milliLiter(s) IV Continuous <Continuous>    Allergies    penicillin (Short breath)    Intolerances          Vital Signs Last 24 Hrs  T(C): 37.1 (19 Nov 2020 05:26), Max: 37.1 (19 Nov 2020 05:26)  T(F): 98.8 (19 Nov 2020 05:26), Max: 98.8 (19 Nov 2020 05:26)  HR: 70 (19 Nov 2020 08:00) (54 - 90)  BP: 137/86 (19 Nov 2020 08:00) (114/57 - 187/88)  BP(mean): 106 (19 Nov 2020 08:00) (80 - 127)  RR: 16 (19 Nov 2020 07:00) (15 - 20)  SpO2: 97% (19 Nov 2020 08:00) (95% - 100%)    I&O's Summary    18 Nov 2020 07:01  -  19 Nov 2020 07:00  --------------------------------------------------------  IN: 1599.5 mL / OUT: 650 mL / NET: 949.5 mL    19 Nov 2020 07:01  -  19 Nov 2020 09:23  --------------------------------------------------------  IN: 118 mL / OUT: 0 mL / NET: 118 mL        Physical Exam:  General: NAD, resting comfortably  Pulmonary: normal resp effort  Cardiovascular: NSR  Abdominal: soft, NT/ND  Extremities: WWP,  slightly edematous, normal strength, no clubbing/cyanosis/edema  Neuro: A/O x 3, no focal deficits, sensation normal    Lines/drains/tubes:    LABS:                        12.4   5.87  )-----------( 209      ( 19 Nov 2020 02:45 )             37.8     11-19    141  |  109<H>  |  14  ----------------------------<  98  4.0   |  21<L>  |  0.85    Ca    9.0      19 Nov 2020 02:45  Phos  3.3     11-19  Mg     2.4     11-19      PT/INR - ( 18 Nov 2020 18:08 )   PT: 14.4 sec;   INR: 1.21          PTT - ( 19 Nov 2020 08:43 )  PTT:>200.0 sec      CAPILLARY BLOOD GLUCOSE      POCT Blood Glucose.: 88 mg/dL (19 Nov 2020 05:27)  POCT Blood Glucose.: 155 mg/dL (18 Nov 2020 23:25)      RADIOLOGY & ADDITIONAL TESTS:

## 2020-11-19 NOTE — BRIEF OPERATIVE NOTE - NSICDXBRIEFPREOP_GEN_ALL_CORE_FT
PRE-OP DIAGNOSIS:  DVT, lower extremity 18-Nov-2020 17:26:36  Ronnie Bolton  
PRE-OP DIAGNOSIS:  DVT, lower extremity 18-Nov-2020 17:26:36  Ronnie Bolton

## 2020-11-19 NOTE — DISCHARGE NOTE PROVIDER - NSDCCPTREATMENT_GEN_ALL_CORE_FT
PRINCIPAL PROCEDURE  Procedure: IVC venogram  Findings and Treatment: with thrombolysis       PRINCIPAL PROCEDURE  Procedure: IVC venogram  Findings and Treatment: with thrombolysis      SECONDARY PROCEDURE  Procedure: IVC venogram  Findings and Treatment: with thrombectomy and venoplasty

## 2020-11-19 NOTE — DIETITIAN INITIAL EVALUATION ADULT. - OTHER CALCULATIONS
Ideal body weight (63.5kg) used for calculations as pt >120% of IBW (164%). Nutrient needs based on [facility] standards of care for maintenance in older adults. Needs adjusted for BMI

## 2020-11-19 NOTE — BRIEF OPERATIVE NOTE - OPERATION/FINDINGS
Venogram showed persistently occluded iliac veins and IVC. Performed thrombectomy with Penumbra Indigo Lightning aspiration thrombectomy catheter and balloon venoplasty with 12x40 mustang. Completion venogram showed improved filling of the veins.

## 2020-11-19 NOTE — DISCHARGE NOTE PROVIDER - HOSPITAL COURSE
70 F with PMH of RLE DVT and PE after travel to [place] in July 2019, s/p IVC filter and on Xarelto until Aug 2020. Patient was seeing heme Dr Vazquez at [facility] who advised her to stop Xarelto, Since presented to Urgent care with leg swelling and was found to have bilateral DVT on US, so she was restarted on Xarelto. She continued to have worsening of bilateral lower extremity swelling and pain and presented to Vascular Surgery office for an evaluation. The office duplex showed occluded IVC filter with thrombosed bilateral iliac veins. On 11/18/20 she presented to [facility] for an elective venogram with thrombectomy/thrombolysis. Last dose of Xarelto on 11/17/2020 at night. She was taken to the OR (11/18/20) and was found to chronically occluded bilateral iliac veins, IVC, and filter in IVC. A catheter was placed via R groin into the IVC and above the IVC filter and she received  given tpa infusion overnight, under SICU monitoring. On 11/19/20 she returned to the OR and _____     70 woman with past medical history right lower extremity DVT and PE after travel to [facility] in July 2019 status post IVC filter placement on Xarelto until Aug 2020. Patient was seeing a hematologist, Dr. Vazquez at [facility] who advised her to stop Xarelto. After stoppin Xeralto, she presented to urgent care with leg swelling and was found to have bilateral DVT on ultrasound and she was restarted Xarelto. However, she her bilateral lower extremity swelling and pain worsened and she presented for vascular surgery office evaluation. An in office duplex showed occluded IVC filter with thrombosed bilateral iliac veins. On 11/18/20 she presented to [facility] for an elective venogram with thrombectomy/thrombolysis. Her most recent dose of Xarelto was on 11/17/2020 at night, one day prior to her surgery.     She was taken to the operating room (11/18/20) and was found to have chronically occluded bilateral iliac veins, IVC, and filter in IVC. A catheter was placed via left groin into the IVC filter and via right groin above the IVC filter through which she received tpa infusion overnight with simultaneous peripheral heparin infusion, under SICU monitoring.     On 11/19/20 she returned to the operating room and underwent venogram with thrombectomy and venoplasty. She tolerated the procedure well and her groins remained soft in the post operative period. Today patient is feeling well, all the VS are within normal limits, her groins remain soft, pain is well controlled on oral pain medication, tolerating diet without nausea, and ambulating independently - patient is stable and ready for discharge today. She was transitioned from a heparin drip to Xeralto on the morning of discharge and is aware to resume taking Xeralto at her home dose of 20 milligrams daily. She is aware of warning signs and to follow-up with Dr. Justin in 3 weeks.

## 2020-11-19 NOTE — DIETITIAN INITIAL EVALUATION ADULT. - ADD RECOMMEND
1) As medically feasible, recommend advance to Regular diet, monitor need for ONS should pt %PO intake <50% 2) Monitor lytes and replete prn 3) Monitor weights, labs, skin integrity, GI distress 4) Pain and bowel regimen per team 5) RD to reinforce diet edu prn

## 2020-11-19 NOTE — PROGRESS NOTE ADULT - SUBJECTIVE AND OBJECTIVE BOX
S: No new issues/events overnight, no new med c/o    O: ICU Vital Signs Last 24 Hrs  T(F): 98.9 (11-19-20 @ 09:25), Max: 98.9 (11-19-20 @ 09:25)  HR: 62 (11-19-20 @ 10:00) (54 - 90)  BP: 124/62 (11-19-20 @ 10:00) (114/57 - 187/88)  BP(mean): 87 (11-19-20 @ 10:00) (80 - 127)  ABP: --  RR: 16 (11-19-20 @ 07:00) (15 - 20)  SpO2: 97% (11-19-20 @ 10:00) (95% - 100%)    PHYSICAL EXAM:   Neurological: AAOx3, CNII-XII intact,  strength 5/5 b/l  Cardiovascular: RRR  Respiratory: CTA  Gastrointestinal: soft, NT, ND, BS+  Extremities: warm, bilateral LE edema. bilateral groins with catheter/sheath intact, no hematoma. no bleeding.   Vascular: no cyanosis/erythema    LABS:    11-19    141  |  109<H>  |  14  ----------------------------<  98  4.0   |  21<L>  |  0.85    Ca    9.0      19 Nov 2020 02:45  Phos  3.3     11-19  Mg     2.4     11-19                          12.4   5.87  )-----------( 209      ( 19 Nov 2020 02:45 )             37.8   PT/INR - ( 18 Nov 2020 18:08 )   PT: 14.4 sec;   INR: 1.21          PTT - ( 19 Nov 2020 08:43 )  PTT:>200.0 sec  CAPILLARY BLOOD GLUCOSE      POCT Blood Glucose.: 88 mg/dL (19 Nov 2020 05:27)  POCT Blood Glucose.: 155 mg/dL (18 Nov 2020 23:25)    MEDICATIONS  (STANDING):  chlorhexidine 2% Cloths 1 Application(s) Topical daily  glucagon  Injectable 1 milliGRAM(s) IntraMuscular once  heparin  Infusion 900 Unit(s)/Hr (2.5 mL/Hr) IV Continuous <Continuous>  heparin  Infusion 900 Unit(s)/Hr (2.5 mL/Hr) IV Continuous <Continuous>  insulin lispro (ADMELOG) corrective regimen sliding scale   SubCutaneous every 6 hours  sodium chloride 0.9%. 1000 milliLiter(s) (100 mL/Hr) IV Continuous <Continuous>    MEDICATIONS  (PRN):  acetaminophen   Tablet .. 1000 milliGRAM(s) Oral every 6 hours PRN Moderate Pain (4 - 6)      Palacio:	  [ ] None	[ ] Daily Palacio Order Placed	   Indication:	  [ ] Strict I and O's    [ ] Obstruction     [ ] Incontinence + Stage 3 or 4 Decubitus  Central Line:  [ ] None	   [ ]  Medication / TPN Administration     [ ] No Peripheral IV        S: No new issues/events overnight, no new med c/o    O: ICU Vital Signs Last 24 Hrs  T(F): 98.9 (11-19-20 @ 09:25), Max: 98.9 (11-19-20 @ 09:25)  HR: 62 (11-19-20 @ 10:00) (54 - 90)  BP: 124/62 (11-19-20 @ 10:00) (114/57 - 187/88)  BP(mean): 87 (11-19-20 @ 10:00) (80 - 127)  ABP: --  RR: 16 (11-19-20 @ 07:00) (15 - 20)  SpO2: 97% (11-19-20 @ 10:00) (95% - 100%)    PHYSICAL EXAM:   Neurological: AAOx3, CNII-XII intact,  strength 5/5 b/l  Cardiovascular: RRR  Respiratory: CTA  Gastrointestinal: soft, NT, ND, BS+  Extremities: warm, bilateral LE edema. bilateral groins with catheter/sheath intact, no hematoma. no bleeding.   Vascular: no cyanosis/erythema    LABS:    11-19    141  |  109<H>  |  14  ----------------------------<  98  4.0   |  21<L>  |  0.85    Ca    9.0      19 Nov 2020 02:45  Phos  3.3     11-19  Mg     2.4     11-19                          12.4   5.87  )-----------( 209      ( 19 Nov 2020 02:45 )             37.8   PT/INR - ( 18 Nov 2020 18:08 )   PT: 14.4 sec;   INR: 1.21          PTT - ( 19 Nov 2020 08:43 )  PTT:>200.0 sec  CAPILLARY BLOOD GLUCOSE      POCT Blood Glucose.: 88 mg/dL (19 Nov 2020 05:27)  POCT Blood Glucose.: 155 mg/dL (18 Nov 2020 23:25)    MEDICATIONS  (STANDING):  chlorhexidine 2% Cloths 1 Application(s) Topical daily  glucagon  Injectable 1 milliGRAM(s) IntraMuscular once  heparin  Infusion 900 Unit(s)/Hr (2.5 mL/Hr) IV Continuous <Continuous>  heparin  Infusion 900 Unit(s)/Hr (2.5 mL/Hr) IV Continuous <Continuous>  insulin lispro (ADMELOG) corrective regimen sliding scale   SubCutaneous every 6 hours  sodium chloride 0.9%. 1000 milliLiter(s) (100 mL/Hr) IV Continuous <Continuous>    MEDICATIONS  (PRN):  acetaminophen   Tablet .. 1000 milliGRAM(s) Oral every 6 hours PRN Moderate Pain (4 - 6)      Palacio:	  [x ] None	[ ] Daily Palacio Order Placed	   Indication:	  [ ] Strict I and O's    [ ] Obstruction     [ ] Incontinence + Stage 3 or 4 Decubitus  Central Line:  [x ] None	   [ ]  Medication / TPN Administration     [ ] No Peripheral IV

## 2020-11-19 NOTE — DISCHARGE NOTE PROVIDER - CARE PROVIDER_API CALL
Melchor Justin)  [facility] Surgery  Vascular  [address]  [place], [place] [address]  Phone: [phone]  Fax: [phone]  Follow Up Time:    Melchor Justin)  [facility] Surgery  Vascular  [address]  [place] [address]  Phone: [phone]  Fax: [phone]  Scheduled Appointment: 12/10/2020 02:00 PM

## 2020-11-19 NOTE — PACU DISCHARGE NOTE - COMMENTS
Pt A&ox4, operative site clean, dry and intact to b/l groin sites, b/l lle pulses palpable, pt remains for four hours (19:30-23:30); pt reports pain controlled per regimen, heparin gtt cont at 18ml/hr, next ptt due midnight. Plan of care endorsed to receiving RENATO Echavarria 5646.1

## 2020-11-19 NOTE — DISCHARGE NOTE PROVIDER - PROVIDER TOKENS
PROVIDER:[TOKEN:[[id number]]] PROVIDER:[TOKEN:[[id number]],SCHEDULEDAPPT:[12/10/2020],SCHEDULEDAPPTTIME:[02:00 PM]]

## 2020-11-19 NOTE — DISCHARGE NOTE PROVIDER - NSDCCPCAREPLAN_GEN_ALL_CORE_FT
PRINCIPAL DISCHARGE DIAGNOSIS  Diagnosis: DVT, lower extremity  Assessment and Plan of Treatment:        PRINCIPAL DISCHARGE DIAGNOSIS  Diagnosis: DVT, lower extremity  Assessment and Plan of Treatment: bilateral      SECONDARY DISCHARGE DIAGNOSES  Diagnosis: Acute blood loss anemia  Assessment and Plan of Treatment:     Diagnosis: Psoriasis  Assessment and Plan of Treatment:

## 2020-11-19 NOTE — DIETITIAN INITIAL EVALUATION ADULT. - PERSON TAUGHT/METHOD
patient instructed/Provided weight management diet edu upon pt request, encouraged adequate protein/calorie intake to meet needs once diet is advanced with a focus on protein first at meal times- pt appeared receptive/verbal instruction

## 2020-11-19 NOTE — DISCHARGE NOTE PROVIDER - NSDCFUADDINST_GEN_ALL_CORE_FT
FOLLOW UP: Dr. Justin in 1 week. Your appointment has been made for _______.   Call the office at  with any questions.  WOUND CARE: You may shower; soap and water over puncture sites. Do not scrub. Pat dry when done.   ACTIVITY: Ambulate as tolerated, but no heavy lifting (>10lbs) or strenuous exercise.  Call the office if you experience increasing pain, redness, swelling or drainage from incision sites, new leg swelling/heaviness/pain, shortness of breath or chest pain, chills or temperature >101.4F.   NEW MEDICATIONS:  ADDITIONAL FOLLOW UP AFTER DISCHARGE:  DISCHARGE DESTINATION:  FOLLOW UP: Dr. Justin in 3 weeks. Your appointment has been made for Thursday, November 10th. Call the office at  with any questions.    WOUND CARE: You may shower; soap and water over puncture sites. Do not scrub. Pat dry when done. You may remove your gauze and tegaderm dressings tomorrow. Wear compression stockings to mid thigh everyday, ensure 30-40mmHg compression. You may remove compression stockings at night for sleep.     ACTIVITY: Ambulate as tolerated, but no heavy lifting (>10lbs) or strenuous exercise.    WARNING SIGNS: Call the office if you experience increasing pain, redness, swelling or drainage from incision sites, new leg swelling/heaviness/pain, shortness of breath or chest pain, chills or temperature >101.4F.     NEW MEDICATIONS:  None.   **CONTINUE XERALTO 20 milligrams orally once per day**    ADDITIONAL FOLLOW UP AFTER DISCHARGE:  Follow-up with your primary care provider within 1-2 weeks of discharge.     DISCHARGE DESTINATION: Home FOLLOW UP: Dr. Justin  on12/10/20 at 2pm. Call the office at  with any questions.    WOUND CARE: You may shower; soap and water over puncture sites. Do not scrub. Pat dry when done. You may remove your gauze and tegaderm dressings tomorrow. Wear compression stockings to mid thigh everyday, ensure 30-40mmHg compression. You may remove compression stockings at night for sleep.     ACTIVITY: Ambulate as tolerated, but no heavy lifting (>10lbs) or strenuous exercise.    WARNING SIGNS: Call the office if you experience increasing pain, redness, swelling or drainage from incision sites, new leg swelling/heaviness/pain, shortness of breath or chest pain, chills or temperature >101.4F.     NEW MEDICATIONS:  None.   **CONTINUE XARELTO 20 milligrams orally once per day**    ADDITIONAL FOLLOW UP AFTER DISCHARGE:  Follow-up with your primary care provider within 1-2 weeks of discharge.     DISCHARGE DESTINATION: Home

## 2020-11-19 NOTE — BRIEF OPERATIVE NOTE - NSICDXBRIEFPROCEDURE_GEN_ALL_CORE_FT
PROCEDURES:  IVC venogram 18-Nov-2020 17:27:04 with thrombolysis Ronnie Bolton  
PROCEDURES:  IVC venogram 18-Nov-2020 17:27:04 with thrombectomy and venoplasty Ronnie Bolton

## 2020-11-19 NOTE — DIETITIAN INITIAL EVALUATION ADULT. - OTHER INFO
70F no PMH, RLE DVT and PE in July2019, s/p IVC filter and Xarelto until Aug 2020, restarted Xarelto October, outpatient US found occluded IVC filter with thrombosed bilateral iliac veins, here for elective venogram 11/18, which showed chronically occluded bilateral iliac veins, IVC. Now s/p placement of catheter for direct tpa thrombolysis.   Upon assessment, pt resting up in bed, pleasant. Pt states good appetite and PO intake PTA, intentionally trying to lose weight over the past 3 weeks by watching portion sizes. Pt states she prepares and cooks all of her meals at home with little to no added salt. Reports UBW~235 lbs, current admission weight 229 lbs, pt endorses weight loss of 6lbs/2.5% over the past 3 weeks. Endorses NKFA, no chewing or swallowing difficulties. Denies n/v/c/d, last BM yesterday morning PTA, states she has regular BMs every morning. Provided weight management diet edu upon pt request, encouraged adequate protein/calorie intake to meet needs once diet is advanced with a focus on protein first at meal times- pt appeared receptive. Heparin drip running @9mL/hr; Skin: no edema, no pressure injuries, Chas score 18. Please see nutrition recommendations below, RD to monitor and f/u per protocol.

## 2020-11-20 ENCOUNTER — TRANSCRIPTION ENCOUNTER (OUTPATIENT)
Age: 70
End: 2020-11-20

## 2020-11-20 VITALS — TEMPERATURE: 98 F

## 2020-11-20 PROBLEM — I26.99 OTHER PULMONARY EMBOLISM WITHOUT ACUTE COR PULMONALE: Chronic | Status: ACTIVE | Noted: 2020-10-06

## 2020-11-20 PROBLEM — L40.9 PSORIASIS, UNSPECIFIED: Chronic | Status: ACTIVE | Noted: 2020-11-17

## 2020-11-20 LAB
ANION GAP SERPL CALC-SCNC: 11 MMOL/L — SIGNIFICANT CHANGE UP (ref 5–17)
APTT BLD: >200 SEC — CRITICAL HIGH (ref 27.5–35.5)
APTT BLD: >200 SEC — CRITICAL HIGH (ref 27.5–35.5)
BUN SERPL-MCNC: 10 MG/DL — SIGNIFICANT CHANGE UP (ref 7–23)
CALCIUM SERPL-MCNC: 8.3 MG/DL — LOW (ref 8.4–10.5)
CHLORIDE SERPL-SCNC: 110 MMOL/L — HIGH (ref 96–108)
CO2 SERPL-SCNC: 18 MMOL/L — LOW (ref 22–31)
CREAT SERPL-MCNC: 0.86 MG/DL — SIGNIFICANT CHANGE UP (ref 0.5–1.3)
GLUCOSE BLDC GLUCOMTR-MCNC: 106 MG/DL — HIGH (ref 70–99)
GLUCOSE BLDC GLUCOMTR-MCNC: 144 MG/DL — HIGH (ref 70–99)
GLUCOSE SERPL-MCNC: 102 MG/DL — HIGH (ref 70–99)
HCT VFR BLD CALC: 33.8 % — LOW (ref 34.5–45)
HGB BLD-MCNC: 10.9 G/DL — LOW (ref 11.5–15.5)
MAGNESIUM SERPL-MCNC: 2 MG/DL — SIGNIFICANT CHANGE UP (ref 1.6–2.6)
MCHC RBC-ENTMCNC: 29.1 PG — SIGNIFICANT CHANGE UP (ref 27–34)
MCHC RBC-ENTMCNC: 32.2 GM/DL — SIGNIFICANT CHANGE UP (ref 32–36)
MCV RBC AUTO: 90.4 FL — SIGNIFICANT CHANGE UP (ref 80–100)
NRBC # BLD: 0 /100 WBCS — SIGNIFICANT CHANGE UP (ref 0–0)
PHOSPHATE SERPL-MCNC: 3.2 MG/DL — SIGNIFICANT CHANGE UP (ref 2.5–4.5)
PLATELET # BLD AUTO: 158 K/UL — SIGNIFICANT CHANGE UP (ref 150–400)
POTASSIUM SERPL-MCNC: 3.9 MMOL/L — SIGNIFICANT CHANGE UP (ref 3.5–5.3)
POTASSIUM SERPL-SCNC: 3.9 MMOL/L — SIGNIFICANT CHANGE UP (ref 3.5–5.3)
RBC # BLD: 3.74 M/UL — LOW (ref 3.8–5.2)
RBC # FLD: 13.8 % — SIGNIFICANT CHANGE UP (ref 10.3–14.5)
SODIUM SERPL-SCNC: 139 MMOL/L — SIGNIFICANT CHANGE UP (ref 135–145)
WBC # BLD: 4.96 K/UL — SIGNIFICANT CHANGE UP (ref 3.8–10.5)
WBC # FLD AUTO: 4.96 K/UL — SIGNIFICANT CHANGE UP (ref 3.8–10.5)

## 2020-11-20 PROCEDURE — 85027 COMPLETE CBC AUTOMATED: CPT

## 2020-11-20 PROCEDURE — 80048 BASIC METABOLIC PNL TOTAL CA: CPT

## 2020-11-20 PROCEDURE — 83735 ASSAY OF MAGNESIUM: CPT

## 2020-11-20 PROCEDURE — 83036 HEMOGLOBIN GLYCOSYLATED A1C: CPT

## 2020-11-20 PROCEDURE — C1887: CPT

## 2020-11-20 PROCEDURE — C1757: CPT

## 2020-11-20 PROCEDURE — 93306 TTE W/DOPPLER COMPLETE: CPT

## 2020-11-20 PROCEDURE — 99223 1ST HOSP IP/OBS HIGH 75: CPT

## 2020-11-20 PROCEDURE — 85730 THROMBOPLASTIN TIME PARTIAL: CPT

## 2020-11-20 PROCEDURE — 82962 GLUCOSE BLOOD TEST: CPT

## 2020-11-20 PROCEDURE — 36415 COLL VENOUS BLD VENIPUNCTURE: CPT

## 2020-11-20 PROCEDURE — C1894: CPT

## 2020-11-20 PROCEDURE — 85384 FIBRINOGEN ACTIVITY: CPT

## 2020-11-20 PROCEDURE — C1725: CPT

## 2020-11-20 PROCEDURE — 84100 ASSAY OF PHOSPHORUS: CPT

## 2020-11-20 PROCEDURE — 76000 FLUOROSCOPY <1 HR PHYS/QHP: CPT

## 2020-11-20 PROCEDURE — C1769: CPT

## 2020-11-20 PROCEDURE — C1751: CPT

## 2020-11-20 PROCEDURE — 85610 PROTHROMBIN TIME: CPT

## 2020-11-20 RX ORDER — HEPARIN SODIUM 5000 [USP'U]/ML
1600 INJECTION INTRAVENOUS; SUBCUTANEOUS
Qty: 25000 | Refills: 0 | Status: DISCONTINUED | OUTPATIENT
Start: 2020-11-20 | End: 2020-11-20

## 2020-11-20 RX ORDER — RIVAROXABAN 15 MG-20MG
20 KIT ORAL
Refills: 0 | Status: DISCONTINUED | OUTPATIENT
Start: 2020-11-20 | End: 2020-11-20

## 2020-11-20 RX ADMIN — HEPARIN SODIUM 16 UNIT(S)/HR: 5000 INJECTION INTRAVENOUS; SUBCUTANEOUS at 02:09

## 2020-11-20 RX ADMIN — RIVAROXABAN 20 MILLIGRAM(S): KIT at 09:01

## 2020-11-20 RX ADMIN — Medication 1000 MILLIGRAM(S): at 06:50

## 2020-11-20 RX ADMIN — HEPARIN SODIUM 16 UNIT(S)/HR: 5000 INJECTION INTRAVENOUS; SUBCUTANEOUS at 08:28

## 2020-11-20 RX ADMIN — Medication 40 MILLIEQUIVALENT(S): at 00:50

## 2020-11-20 RX ADMIN — Medication 1000 MILLIGRAM(S): at 06:06

## 2020-11-20 NOTE — PROGRESS NOTE ADULT - ASSESSMENT
A/P: 70F no PMH, RLE DVT and PE in July2019, s/p IVC filter and Xarelto until Aug 2020, restarted Xarelto October, outpatient US found occluded IVC filter with thrombosed bilateral iliac veins, here for elective venogram, which showed chronically occluded bilateral iliac veins, IVC. Now s/p placement of catheter for direct tpa thrombolysis    Going to OR today for relook  TPA was Dc'D for low fibrinogen and replaced with heparin  Vitals are stable  Hgb is 12.4    Plan:  - Keep NPO for OR today   - Continue Fibrinogen checks Q4H  - Restart TPA at 0.25 if Fibrinogen >100  - Rest of care as per SICU
In summary, this is a 69yo woman with a PMH of DVT/PE (h/o IVC filter, on Xarelto) who p/w worsening bilateral lower extremity swelling and pain, found to have an occluded IVC filter with thrombosed bilateral iliac veins, now s/p a successful venogram and thrombectomy w/venoplasty (11/19).      #IVC filter and bilateral iliac vein thrombosis   -- s/p venogram and thrombectomy w/venoplasty   -- c/w leg wraps  -- restarted on Xarelto     #Acute Blood Loss Anemia  -- 2/2 recent procedure  -- hemodynamically stable   -- can continue to monitor     #Diet - Diet, Regular (11-19-20 @ 20:07) [Active]  #DVT PPx - therapeutic Xarelto  #Dispo - Home today     La Nena Irving  Attending Hospitalist  [phone]
70F no PMH, RLE DVT and PE in July2019, s/p IVC filter and xarelto until Aug 2020, restarted xarelto October, outpatient US found occluded IVC filter with thrombosed bilateral iliac veins, here for elective venogram, which showed chronically occluded bilateral iliac veins, IVC, s/p placement of catheter for direct tpa thrombolysis, transferred to SICU for close monitoring while getting continuous tpa gtts.     NEURO: neuro checks q1h, avoid narcotics.   CV: stable.   VASC: fibrinogen low overnight, stopped tpa and switched to therapeutic heparin split between left and right groin. check serial fibrinogen and ptt.   PULM: room air. no resp distress.   GI/FEN: NPO  for repeat venogram today, NS@100.   : voids.   ENDO: ISS  ID: no issues  PPX: no SCDs,   LINES: PIVs, tpa catheter  WOUNDS/DRAINS: bilateral groin puncture.

## 2020-11-20 NOTE — PROGRESS NOTE ADULT - SUBJECTIVE AND OBJECTIVE BOX
Vascular Co-Management Initial Consult Note    HPI:  This is a 71yo woman with a PMH of DVT/PE (h/o IVC filter, on Xarelto) who p/w worsening bilateral lower extremity swelling and pain.  She went to clinic where she was found to have an occluded IVC filter with thrombosed bilateral iliac veins.  She then presented for an elective venogram with thrombectomy vs thrombolysis. She went to the OR on , where she had a venogram and thrombectomy w/venoplasty performed.  This morning she reports feeling well.  Denies any pain in her legs but endorses a good appetite, no difficulty with ambulation, +BM.  ROS also negative for fevers, cough, CP, SOB, CASTAÑEDA, palpitations and orthopnea.      PAST MEDICAL & SURGICAL HISTORY:  -- Psoriasis  -- DVT (deep venous thrombosis), RLE  -- PE (pulmonary thromboembolism), bilateral 2019  -- H/O:  section  -- Presence of IVC filter    Home Medications:  -- Xarelto 20 mg oral tablet: 1 tab(s) orally once a day (in the evening) (2020 11:21)    Allergies  -- penicillin (Short breath)    FAMILY HISTORY:  -- Non-contributory to this presentation.     Social History:  -- not currently smoking    CURRENT MEDICATIONS:   acetaminophen   Tablet .. 1000 milliGRAM(s) Oral every 6 hours PRN  chlorhexidine 2% Cloths 1 Application(s) Topical daily  dextrose 40% Gel 15 Gram(s) Oral once  dextrose 5%. 1000 milliLiter(s) IV Continuous <Continuous>  dextrose 5%. 1000 milliLiter(s) IV Continuous <Continuous>  dextrose 50% Injectable 25 Gram(s) IV Push once  dextrose 50% Injectable 12.5 Gram(s) IV Push once  dextrose 50% Injectable 25 Gram(s) IV Push once  glucagon  Injectable 1 milliGRAM(s) IntraMuscular once  insulin lispro (ADMELOG) corrective regimen sliding scale   SubCutaneous every 6 hours  rivaroxaban 20 milliGRAM(s) Oral with dinner    VITAL SIGNS, INS/OUTS (last 24 hours):  Vital Signs Last 24 Hrs  T(C): 36.4 (2020 10:44), Max: 37.5 (2020 16:45)  T(F): 97.6 (2020 10:44), Max: 99.5 (2020 16:45)  HR: 71 (2020 08:44) (53 - 83)  BP: 155/70 (2020 08:44) (131/62 - 158/70)  BP(mean): 89 (2020 21:02) (89 - 102)  RR: 16 (2020 08:44) (14 - 21)  SpO2: 99% (2020 08:44) (97% - 100%)  I&O's Summary    2020 07:01  -  2020 07:00  --------------------------------------------------------  IN: 1530 mL / OUT: 800 mL / NET: 730 mL    2020 07:01  -  2020 11:14  --------------------------------------------------------  IN: 180 mL / OUT: 0 mL / NET: 180 mL    EXAM:  Gen: NAD, sitting upright in bed  HEENT: NCAT, MMM, clear OP  CV: RRR, no m/g/r appreciated  Pulm: CTA B, no w/r/r; no increase in WOB  Abd: normoactive BS, soft, NTND  Ext: WWP,BLE w/ace bandages to above her knee; bilateral set of toes w/some appreciable swelling  Neuro: AOx3, nonfocal  Psych: pleasant, conversant and appropriate    BASIC LABS:                        10.9   4.96  )-----------( 158      ( 2020 07:38 )             33.8     11-20    139  |  110<H>  |  10  ----------------------------<  102<H>  3.9   |  18<L>  |  0.86    Ca    8.3<L>      2020 07:38  Phos  3.2     11-20  Mg     2.0     11-20    PT/INR - ( 2020 18:08 )   PT: 14.4 sec;   INR: 1.21     PTT - ( 2020 07:38 )  PTT:>200.0 sec    CAPILLARY BLOOD GLUCOSE  POCT Blood Glucose.: 106 mg/dL (2020 07:56)  POCT Blood Glucose.: 144 mg/dL (2020 00:30)  POCT Blood Glucose.: 73 mg/dL (2020 16:28)    MICRODATA:  -- No new microdata.    IMAGING:  -- No new imaging.

## 2020-11-20 NOTE — PROGRESS NOTE ADULT - SUBJECTIVE AND OBJECTIVE BOX
O/N: MENG, VSS, ptt > 200 x2 decr hep18->16ml                                    A/P: 70F no PMH, RLE DVT and PE in July2019, s/p IVC filter and Xarelto until Aug 2020, restarted Xarelto October, outpatient US found occluded IVC filter with thrombosed bilateral iliac veins, here for elective venogram, which showed chronically occluded bilateral iliac veins, IVC. Now s/p placement of catheter for direct tpa thrombolysis        - Bedrest till am  -Reg Diet  -poss dc today       O/N: MENG, VSS, ptt > 200 x2 decr hep18->16ml    rivaroxaban 20      Allergies    penicillin (Short breath)    Intolerances        Vital Signs Last 24 Hrs  T(C): 37.1 (20 Nov 2020 06:15), Max: 37.5 (19 Nov 2020 16:45)  T(F): 98.7 (20 Nov 2020 06:15), Max: 99.5 (19 Nov 2020 16:45)  HR: 71 (20 Nov 2020 08:44) (53 - 83)  BP: 155/70 (20 Nov 2020 08:44) (124/62 - 158/70)  BP(mean): 89 (19 Nov 2020 21:02) (87 - 102)  RR: 16 (20 Nov 2020 08:44) (14 - 21)  SpO2: 99% (20 Nov 2020 08:44) (97% - 100%)  I&O's Summary    19 Nov 2020 07:01  -  20 Nov 2020 07:00  --------------------------------------------------------  IN: 1530 mL / OUT: 800 mL / NET: 730 mL        Physical Exam:  General:  Pulmonary:  Cardiovascular:  Abdominal:  Extremities:  Pulses:   Right:                                                                          Left:  FEM [ ]2+ [ ]1+ [ ]doppler                                             FEM [ ]2+ [ ]1+ [ ]doppler    POP [ ]2+ [ ]1+ [ ]doppler                                             POP [ ]2+ [ ]1+ [ ]doppler    DP [ ]2+ [ ]1+ [ ]doppler                                                DP [ ]2+ [ ]1+ [ ]doppler  PT[ ]2+ [ ]1+ [ ]doppler                                                  PT [ ]2+ [ ]1+ [ ]doppler      LABS:                        10.9   4.96  )-----------( 158      ( 20 Nov 2020 07:38 )             33.8     11-20    139  |  110<H>  |  10  ----------------------------<  102<H>  3.9   |  18<L>  |  0.86    Ca    8.3<L>      20 Nov 2020 07:38  Phos  3.2     11-20  Mg     2.0     11-20      PT/INR - ( 18 Nov 2020 18:08 )   PT: 14.4 sec;   INR: 1.21          PTT - ( 20 Nov 2020 07:38 )  PTT:>200.0 sec    ---------------------------------------------------------------------------  PLEASE CHECK WHEN PRESENT:     [  ] Heart Failure     [  ] Acute     [  ] Acute on Chronic     [  ] Chronic  -------------------------------------------------------------------     [  ]Diastolic [HFpEF]     [  ]Systolic [HFrEF]     [  ]Combined [HFpEF & HFrEF]     [  ] afib     [  ] hypertensive heart disease     [  ]Other:  -------------------------------------------------------------------  [ ] Respiratory failure  [ ] Acute cor pulmonale  [ ] Asthma/COPD Exacerbation  [ ] Pleural effusion  [ ] Aspiration pneumonia  -------------------------------------------------------------------  [  ]SANDRA     [  ]ATN     [  ]Reneal Medullary Necrosis     [  ]Renal Cortical Necrosis     [  ]Other Pathological Lesions:    [  ]CKD 1  [  ]CKD 2  [  ]CKD 3  [  ]CKD 4  [  ]CKD 5  [  ]Other  -------------------------------------------------------------------  [  ]Diabetes  [  ] Diabetic PVD Ulcer  [  ] Neuropathic ulcer to DM  [  ] Diabetes with Nephropathy  [  ] Osteomyelitis due to diabetes  --------------------------------------------------------------------  [  ]Malnutrition: See Nutrition Note  [  ]Cachexia  [  ]Other:   [  ]Supplement Ordered:  [  ]Morbid Obesity (BMI >=40]  ---------------------------------------------------------------------  [ ] Sepsis/severe sepsis/septic shock  [ ] UTI  [ ] Pneumonia  -----------------------------------------------------------------------  [ ] Acidosis/alkalosis  [ ] Fluid overload  [ ] Hypokalemia  [ ] Hyperkalemia  [ ] Hypomagnesemia  [ ] Hypophosphatemia  [ ] Hyperphosphatemia  ------------------------------------------------------------------------  [ ] Acute blood loss anemia  [ ] Post op blood loss anemia  [ ] Iron deficiency anemia  [ ] Anemia due to chronic disease  [ ] Hypercoagulable state  ----------------------------------------------------------------------  [ ] Cerebral infarction  [ ] Transient ischemia attack  [ ] Encephalopathy        A/P: 70F no PMH, RLE DVT and PE in July2019, s/p IVC filter and Xarelto until Aug 2020, restarted Xarelto October, outpatient US found occluded IVC filter with thrombosed bilateral iliac veins, here for elective venogram, which showed chronically occluded bilateral iliac veins, IVC. Now s/p placement of catheter for direct tpa thrombolysis        - Bedrest till am  -Reg Diet  -poss dc today       O/N: MENG, VSS, ptt > 200 x2 decr hep18->16ml      Denies SOB, CP this morning. Denies bilateral groin fullness or bleeding. Denies bilateral foot weakness or numbness   rivaroxaban 20      Allergies    penicillin (Short breath)    Intolerances        Vital Signs Last 24 Hrs  T(C): 37.1 (20 Nov 2020 06:15), Max: 37.5 (19 Nov 2020 16:45)  T(F): 98.7 (20 Nov 2020 06:15), Max: 99.5 (19 Nov 2020 16:45)  HR: 71 (20 Nov 2020 08:44) (53 - 83)  BP: 155/70 (20 Nov 2020 08:44) (124/62 - 158/70)  BP(mean): 89 (19 Nov 2020 21:02) (87 - 102)  RR: 16 (20 Nov 2020 08:44) (14 - 21)  SpO2: 99% (20 Nov 2020 08:44) (97% - 100%)  I&O's Summary    19 Nov 2020 07:01  -  20 Nov 2020 07:00  --------------------------------------------------------  IN: 1530 mL / OUT: 800 mL / NET: 730 mL        Physical Exam:  General: NAD  Pulmonary: b/l breath sounds   Cardiovascular: s1s2 RRR  Abdominal: soft NT/ND  Extremities:b/l GSV access sites soft. Left groin site dressing saturated dressing. Bilateral feet with edema noted. compartments soft. palpable pulse         LABS:                        10.9   4.96  )-----------( 158      ( 20 Nov 2020 07:38 )             33.8     11-20    139  |  110<H>  |  10  ----------------------------<  102<H>  3.9   |  18<L>  |  0.86    Ca    8.3<L>      20 Nov 2020 07:38  Phos  3.2     11-20  Mg     2.0     11-20      PT/INR - ( 18 Nov 2020 18:08 )   PT: 14.4 sec;   INR: 1.21          PTT - ( 20 Nov 2020 07:38 )  PTT:>200.0 sec    ---------------------------------------------------------------------------  PLEASE CHECK WHEN PRESENT:     [  ] Heart Failure     [  ] Acute     [  ] Acute on Chronic     [  ] Chronic  -------------------------------------------------------------------     [  ]Diastolic [HFpEF]     [  ]Systolic [HFrEF]     [  ]Combined [HFpEF & HFrEF]     [  ] afib     [  ] hypertensive heart disease     [  ]Other:  -------------------------------------------------------------------  [ ] Respiratory failure  [ ] Acute cor pulmonale  [ ] Asthma/COPD Exacerbation  [ ] Pleural effusion  [ ] Aspiration pneumonia  -------------------------------------------------------------------  [  ]SANDRA     [  ]ATN     [  ]Reneal Medullary Necrosis     [  ]Renal Cortical Necrosis     [  ]Other Pathological Lesions:    [  ]CKD 1  [  ]CKD 2  [  ]CKD 3  [  ]CKD 4  [  ]CKD 5  [  ]Other  -------------------------------------------------------------------  [  ]Diabetes  [  ] Diabetic PVD Ulcer  [  ] Neuropathic ulcer to DM  [  ] Diabetes with Nephropathy  [  ] Osteomyelitis due to diabetes  --------------------------------------------------------------------  [  ]Malnutrition: See Nutrition Note  [  ]Cachexia  [  ]Other:   [  ]Supplement Ordered:  [  ]Morbid Obesity (BMI >=40]  ---------------------------------------------------------------------  [ ] Sepsis/severe sepsis/septic shock  [ ] UTI  [ ] Pneumonia  -----------------------------------------------------------------------  [ ] Acidosis/alkalosis  [ ] Fluid overload  [ ] Hypokalemia  [ ] Hyperkalemia  [ ] Hypomagnesemia  [ ] Hypophosphatemia  [ ] Hyperphosphatemia  ------------------------------------------------------------------------  [ ] Acute blood loss anemia  [ ] Post op blood loss anemia  [ ] Iron deficiency anemia  [ ] Anemia due to chronic disease  [ ] Hypercoagulable state  ----------------------------------------------------------------------  [ ] Cerebral infarction  [ ] Transient ischemia attack  [ ] Encephalopathy        A/P: 70F no PMH, RLE DVT and PE in July2019, s/p IVC filter and Xarelto until Aug 2020, restarted Xarelto October, outpatient US found occluded IVC filter with thrombosed bilateral iliac veins, here for elective venogram, which showed chronically occluded bilateral iliac veins, IVC. Now s/p placement of catheter for direct tpa thrombolysis    Neuro:   -Neurovascular checks to b/l LE per protocol   -PO meds for pain control     Cards:   -no active issues     Resp:   -no active issues     GI:   restarted on PO diet     Renal:   -Cr stable     Heme:   -Heparin gtt ON   -Transition to home Xarelto       DVT PPX:   -Xarelto     Dispo:   -d/c home today

## 2020-11-20 NOTE — DISCHARGE NOTE NURSING/CASE MANAGEMENT/SOCIAL WORK - PATIENT PORTAL LINK FT
You can access the FollowMyHealth Patient Portal offered by [facility] by registering at the following website: [email or web address]. By joining [employer]’s FollowMyHealth portal, you will also be able to view your health information using other applications (apps) compatible with our system.

## 2020-11-20 NOTE — PROVIDER CONTACT NOTE (CRITICAL VALUE NOTIFICATION) - ACTION/TREATMENT ORDERED:
decrease heparin drip to 2.5 and repeat labs at 2pm
decrease heparin rate to 16cc/hr, RN will continue to monitor, f/u next PTT w/ AM labs

## 2020-11-20 NOTE — PROVIDER CONTACT NOTE (CRITICAL VALUE NOTIFICATION) - BACKGROUND
Pt hx of DVT, s/p thrombectomy and venoplasty thru b/l groin, L groin site light ooze on admission, b/l groin no hematoma on palpation, currently on heparin drip 18cc/hr

## 2020-11-25 DIAGNOSIS — L40.9 PSORIASIS, UNSPECIFIED: ICD-10-CM

## 2020-11-25 DIAGNOSIS — I82.523 CHRONIC EMBOLISM AND THROMBOSIS OF ILIAC VEIN, BILATERAL: ICD-10-CM

## 2020-11-25 DIAGNOSIS — I82.403 ACUTE EMBOLISM AND THROMBOSIS OF UNSPECIFIED DEEP VEINS OF LOWER EXTREMITY, BILATERAL: ICD-10-CM

## 2020-11-25 DIAGNOSIS — Z86.711 PERSONAL HISTORY OF PULMONARY EMBOLISM: ICD-10-CM

## 2020-11-25 DIAGNOSIS — M79.606 PAIN IN LEG, UNSPECIFIED: ICD-10-CM

## 2020-11-25 DIAGNOSIS — E66.9 OBESITY, UNSPECIFIED: ICD-10-CM

## 2020-11-25 DIAGNOSIS — D62 ACUTE POSTHEMORRHAGIC ANEMIA: ICD-10-CM

## 2020-11-25 DIAGNOSIS — T82.9XXA UNSPECIFIED COMPLICATION OF CARDIAC AND VASCULAR PROSTHETIC DEVICE, IMPLANT AND GRAFT, INITIAL ENCOUNTER: ICD-10-CM
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
## 2020-11-25 DIAGNOSIS — I10 ESSENTIAL (PRIMARY) HYPERTENSION: ICD-10-CM

## 2020-11-25 DIAGNOSIS — Z88.0 ALLERGY STATUS TO PENICILLIN: ICD-10-CM

## 2020-12-10 ENCOUNTER — APPOINTMENT (OUTPATIENT)
Dept: VASCULAR SURGERY | Facility: CLINIC | Age: 70
End: 2020-12-10
Payer: COMMERCIAL

## 2020-12-10 PROCEDURE — 99072 ADDL SUPL MATRL&STAF TM PHE: CPT

## 2020-12-10 PROCEDURE — 93979 VASCULAR STUDY: CPT

## 2020-12-10 PROCEDURE — 99213 OFFICE O/P EST LOW 20 MIN: CPT

## 2020-12-11 NOTE — PHYSICAL EXAM
[Respiratory Effort] : normal respiratory effort [Normal Heart Sounds] : normal heart sounds [2+] : left 2+ [Ankle Swelling (On Exam)] : not present [Varicose Veins Of Lower Extremities] : not present [] : not present [Calm] : calm [de-identified] : well appearing

## 2020-12-11 NOTE — DISCUSSION/SUMMARY
[FreeTextEntry1] : 70 year old female presenting to the office for post op. 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 SHe states she is doing so much better since the procedure, both legs are no longer swollen and feel lighter. No CP/SOB. \par \par Plan:\par Xarelto for life due to extensive DVT\par FU with hematologist at [facility]- Dr. Vazquez\par FU in 6 months

## 2020-12-11 NOTE — REASON FOR VISIT
[de-identified] : 11/18/2020 [de-identified] : 70 year old female presenting to the office for post op. 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 SHe states she is doing so much better since the procedure, both legs are no longer swollen and feel lighter. No CP/SOB. Will be on Xarelto for life, hematologist Dr. Vazquez.

## 2021-04-12 ENCOUNTER — NON-APPOINTMENT (OUTPATIENT)
Age: 71
End: 2021-04-12

## 2021-04-13 ENCOUNTER — APPOINTMENT (OUTPATIENT)
Dept: INTERNAL MEDICINE | Facility: CLINIC | Age: 71
End: 2021-04-13
Payer: COMMERCIAL

## 2021-04-13 ENCOUNTER — RESULT REVIEW (OUTPATIENT)
Age: 71
End: 2021-04-13

## 2021-04-13 VITALS
HEART RATE: 85 BPM | DIASTOLIC BLOOD PRESSURE: 83 MMHG | BODY MASS INDEX: 35.79 KG/M2 | OXYGEN SATURATION: 98 % | TEMPERATURE: 97 F | WEIGHT: 228 LBS | HEIGHT: 67 IN | SYSTOLIC BLOOD PRESSURE: 143 MMHG

## 2021-04-13 DIAGNOSIS — R25.1 TREMOR, UNSPECIFIED: ICD-10-CM

## 2021-04-13 DIAGNOSIS — Z12.11 ENCOUNTER FOR SCREENING FOR MALIGNANT NEOPLASM OF COLON: ICD-10-CM

## 2021-04-13 DIAGNOSIS — M54.5 LOW BACK PAIN: ICD-10-CM

## 2021-04-13 DIAGNOSIS — Z12.39 ENCOUNTER FOR OTHER SCREENING FOR MALIGNANT NEOPLASM OF BREAST: ICD-10-CM

## 2021-04-13 PROCEDURE — 99072 ADDL SUPL MATRL&STAF TM PHE: CPT

## 2021-04-13 PROCEDURE — 99387 INIT PM E/M NEW PAT 65+ YRS: CPT

## 2021-04-16 LAB
ALBUMIN SERPL ELPH-MCNC: 4.5 G/DL
ALP BLD-CCNC: 79 U/L
ALT SERPL-CCNC: 15 U/L
ANA SER IF-ACNC: NEGATIVE
ANION GAP SERPL CALC-SCNC: 13 MMOL/L
AST SERPL-CCNC: 20 U/L
BASOPHILS # BLD AUTO: 0.02 K/UL
BASOPHILS NFR BLD AUTO: 0.5 %
BILIRUB DIRECT SERPL-MCNC: 0.2 MG/DL
BILIRUB INDIRECT SERPL-MCNC: 0.6 MG/DL
BILIRUB SERPL-MCNC: 0.8 MG/DL
BUN SERPL-MCNC: 17 MG/DL
CALCIUM SERPL-MCNC: 9.9 MG/DL
CCP AB SER IA-ACNC: <8 UNITS
CHLORIDE SERPL-SCNC: 106 MMOL/L
CHOLEST SERPL-MCNC: 243 MG/DL
CO2 SERPL-SCNC: 22 MMOL/L
CREAT SERPL-MCNC: 1.02 MG/DL
CRP SERPL-MCNC: 4 MG/L
DEPRECATED D DIMER PPP IA-ACNC: 236 NG/ML DDU
EOSINOPHIL # BLD AUTO: 0.11 K/UL
EOSINOPHIL NFR BLD AUTO: 2.7 %
ESTIMATED AVERAGE GLUCOSE: 128 MG/DL
GLUCOSE SERPL-MCNC: 97 MG/DL
HBA1C MFR BLD HPLC: 6.1 %
HCT VFR BLD CALC: 47.6 %
HDLC SERPL-MCNC: 69 MG/DL
HGB BLD-MCNC: 14.8 G/DL
IMM GRANULOCYTES NFR BLD AUTO: 0 %
LDLC SERPL CALC-MCNC: 155 MG/DL
LYMPHOCYTES # BLD AUTO: 1.99 K/UL
LYMPHOCYTES NFR BLD AUTO: 48.8 %
MAN DIFF?: NORMAL
MCHC RBC-ENTMCNC: 29.2 PG
MCHC RBC-ENTMCNC: 31.1 GM/DL
MCV RBC AUTO: 94.1 FL
MONOCYTES # BLD AUTO: 0.24 K/UL
MONOCYTES NFR BLD AUTO: 5.9 %
NEUTROPHILS # BLD AUTO: 1.72 K/UL
NEUTROPHILS NFR BLD AUTO: 42.1 %
NONHDLC SERPL-MCNC: 175 MG/DL
PLATELET # BLD AUTO: 247 K/UL
POTASSIUM SERPL-SCNC: 4.7 MMOL/L
PROT SERPL-MCNC: 7.8 G/DL
RBC # BLD: 5.06 M/UL
RBC # FLD: 14.5 %
RF+CCP IGG SER-IMP: NEGATIVE
SODIUM SERPL-SCNC: 141 MMOL/L
TRIGL SERPL-MCNC: 98 MG/DL
TSH SERPL-ACNC: 0.93 UIU/ML
VIT B12 SERPL-MCNC: 375 PG/ML
WBC # FLD AUTO: 4.08 K/UL

## 2021-04-17 ENCOUNTER — TRANSCRIPTION ENCOUNTER (OUTPATIENT)
Age: 71
End: 2021-04-17

## 2021-04-17 ENCOUNTER — APPOINTMENT (OUTPATIENT)
Dept: RADIOLOGY | Facility: IMAGING CENTER | Age: 71
End: 2021-04-17
Payer: COMMERCIAL

## 2021-04-17 ENCOUNTER — OUTPATIENT (OUTPATIENT)
Dept: OUTPATIENT SERVICES | Facility: HOSPITAL | Age: 71
LOS: 1 days | End: 2021-04-17
Payer: COMMERCIAL

## 2021-04-17 DIAGNOSIS — Z98.891 HISTORY OF UTERINE SCAR FROM PREVIOUS SURGERY: Chronic | ICD-10-CM

## 2021-04-17 DIAGNOSIS — Z95.828 PRESENCE OF OTHER VASCULAR IMPLANTS AND GRAFTS: Chronic | ICD-10-CM

## 2021-04-17 DIAGNOSIS — M54.5 LOW BACK PAIN: ICD-10-CM

## 2021-04-17 PROCEDURE — 73560 X-RAY EXAM OF KNEE 1 OR 2: CPT

## 2021-04-17 PROCEDURE — 72100 X-RAY EXAM L-S SPINE 2/3 VWS: CPT | Mod: 26

## 2021-04-17 PROCEDURE — 73560 X-RAY EXAM OF KNEE 1 OR 2: CPT | Mod: 26,LT

## 2021-04-17 PROCEDURE — 72100 X-RAY EXAM L-S SPINE 2/3 VWS: CPT

## 2021-04-28 NOTE — HISTORY OF PRESENT ILLNESS
[de-identified] : Daughter: Smita [phone]\par Needs a new PCP. Has several concerns. Many have existed for a few weeks. \par \par Tremor-  Long standing. L hand worse then R. Worse with intention and when nervous. Saw Neurologist in 2019 bc of COVID never followed-up of completed work-up. Tremor has not worsened. Family Hx of Parkinson's.\par \par Fatigue. Has existed for some time, several months. Working 9-10 hr days. To bed at 11pm up at 6am-7am. No consistent walking. One cup of coffee a day. Sedentary most of day. Denies depression.   \par \par Back pain. When walking. Lower back. No radiation. No other neuro or bowel/bladder symptoms. Highest weight 230lbs, over 200 most of adult life. \par \par Knee pain. No current imaging. PT in past but did not work a lot. Did not carry exercises over to home. No slipping, giving or falls.\par \par DVT/PE on lifelong Xarelto. No current issues with bruising or bleeding. 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

## 2021-04-28 NOTE — PHYSICAL EXAM
[No Acute Distress] : no acute distress [Well-Appearing] : well-appearing [PERRL] : pupils equal round and reactive to light [EOMI] : extraocular movements intact [Normal Oropharynx] : the oropharynx was normal [Normal TMs] : both tympanic membranes were normal [Supple] : supple [No Respiratory Distress] : no respiratory distress  [No Accessory Muscle Use] : no accessory muscle use [Clear to Auscultation] : lungs were clear to auscultation bilaterally [Normal Rate] : normal rate  [Regular Rhythm] : with a regular rhythm [Normal S1, S2] : normal S1 and S2 [Soft] : abdomen soft [Non Tender] : non-tender [Non-distended] : non-distended [Coordination Grossly Intact] : coordination grossly intact [No Focal Deficits] : no focal deficits [Alert and Oriented x3] : oriented to person, place, and time [de-identified] : trace edema [de-identified] : paraspinal tenderness, no spinal tenderness [de-identified] : no effusions seen [de-identified] : no skin plaques, nl gait [de-identified] : no rigidity, no cerebellar signs

## 2021-04-28 NOTE — PLAN
[FreeTextEntry1] : 69 yo woman here to establish care\par Addressed age appropriate screening, has referrals\par Return for Women's appt (breast and pelvic)\par \par MSK pains- Suspect from deconditioning but can be related might be inflamm. Tylenol, ice ,ROM exercises. Imaging. Discussed weight as it relates to back and knee pain as well.  \par \par Psorias- To see derm and rheum. Skin currently clear. See if link to joint symptoms\par \par Tremor- Suspect intention tremor, return to see neuro \par \par DVT/PE- On lifelong Tx. WIll attempt to get records

## 2021-04-28 NOTE — HEALTH RISK ASSESSMENT
[Patient reported mammogram was normal] : Patient reported mammogram was normal [Patient reported colonoscopy was normal] : Patient reported colonoscopy was normal [Fair] :  ~his/her~ mood as fair [] : No [No] : No [No falls in past year] : Patient reported no falls in the past year [de-identified] : sits most of day [de-identified] : admits that should be healthier [Change in mental status noted] : No change in mental status noted [Language] : denies difficulty with language [Behavior] : denies difficulty with behavior [Handling Complex Tasks] : denies difficulty handling complex tasks [None] : None [With Family] : lives with family [Employed] : employed [] :  [Sexually Active] : not sexually active [High Risk Behavior] : no high risk behavior [Feels Safe at Home] : Feels safe at home [Fully functional (bathing, dressing, toileting, transferring, walking, feeding)] : Fully functional (bathing, dressing, toileting, transferring, walking, feeding) [Fully functional (using the telephone, shopping, preparing meals, housekeeping, doing laundry, using] : Fully functional and needs no help or supervision to perform IADLs (using the telephone, shopping, preparing meals, housekeeping, doing laundry, using transportation, managing medications and managing finances) [Reports changes in hearing] : Reports no changes in hearing [Reports changes in vision] : Reports no changes in vision [MammogramDate] : 01/19 [PapSmearDate] : 01/19 [PapSmearComments] : menopause in 50s [BoneDensityDate] : 01/17 [ColonoscopyDate] : 01/17 [ColonoscopyComments] : told every 5yrs [de-identified] : still  bbut lives with daughter,  in a different home [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

## 2021-05-08 ENCOUNTER — APPOINTMENT (OUTPATIENT)
Dept: MAMMOGRAPHY | Facility: IMAGING CENTER | Age: 71
End: 2021-05-08
Payer: COMMERCIAL

## 2021-05-08 ENCOUNTER — RESULT REVIEW (OUTPATIENT)
Age: 71
End: 2021-05-08

## 2021-05-08 ENCOUNTER — OUTPATIENT (OUTPATIENT)
Dept: OUTPATIENT SERVICES | Facility: HOSPITAL | Age: 71
LOS: 1 days | End: 2021-05-08
Payer: COMMERCIAL

## 2021-05-08 DIAGNOSIS — Z95.828 PRESENCE OF OTHER VASCULAR IMPLANTS AND GRAFTS: Chronic | ICD-10-CM

## 2021-05-08 DIAGNOSIS — Z12.39 ENCOUNTER FOR OTHER SCREENING FOR MALIGNANT NEOPLASM OF BREAST: ICD-10-CM

## 2021-05-08 DIAGNOSIS — Z98.891 HISTORY OF UTERINE SCAR FROM PREVIOUS SURGERY: Chronic | ICD-10-CM

## 2021-05-08 PROCEDURE — 77067 SCR MAMMO BI INCL CAD: CPT | Mod: 26

## 2021-05-08 PROCEDURE — 77067 SCR MAMMO BI INCL CAD: CPT

## 2021-05-08 PROCEDURE — 77063 BREAST TOMOSYNTHESIS BI: CPT

## 2021-05-08 PROCEDURE — 77063 BREAST TOMOSYNTHESIS BI: CPT | Mod: 26

## 2021-05-16 ENCOUNTER — OUTPATIENT (OUTPATIENT)
Dept: OUTPATIENT SERVICES | Facility: HOSPITAL | Age: 71
LOS: 1 days | Discharge: HOME | End: 2021-05-16

## 2021-05-16 DIAGNOSIS — D68.9 COAGULATION DEFECT, UNSPECIFIED: ICD-10-CM

## 2021-05-16 DIAGNOSIS — Z95.828 PRESENCE OF OTHER VASCULAR IMPLANTS AND GRAFTS: Chronic | ICD-10-CM

## 2021-05-16 DIAGNOSIS — Z98.891 HISTORY OF UTERINE SCAR FROM PREVIOUS SURGERY: Chronic | ICD-10-CM

## 2021-05-17 ENCOUNTER — APPOINTMENT (OUTPATIENT)
Age: 71
End: 2021-05-17
Payer: COMMERCIAL

## 2021-05-17 ENCOUNTER — RESULT REVIEW (OUTPATIENT)
Age: 71
End: 2021-05-17

## 2021-05-17 ENCOUNTER — LABORATORY RESULT (OUTPATIENT)
Age: 71
End: 2021-05-17

## 2021-05-17 VITALS
WEIGHT: 224.87 LBS | TEMPERATURE: 97.2 F | SYSTOLIC BLOOD PRESSURE: 157 MMHG | BODY MASS INDEX: 35.71 KG/M2 | HEART RATE: 62 BPM | DIASTOLIC BLOOD PRESSURE: 79 MMHG | OXYGEN SATURATION: 96 % | RESPIRATION RATE: 14 BRPM | HEIGHT: 66.61 IN

## 2021-05-17 DIAGNOSIS — Z78.9 OTHER SPECIFIED HEALTH STATUS: ICD-10-CM

## 2021-05-17 DIAGNOSIS — Z82.49 FAMILY HISTORY OF ISCHEMIC HEART DISEASE AND OTHER DISEASES OF THE CIRCULATORY SYSTEM: ICD-10-CM

## 2021-05-17 DIAGNOSIS — Z83.3 FAMILY HISTORY OF DIABETES MELLITUS: ICD-10-CM

## 2021-05-17 DIAGNOSIS — Z82.0 FAMILY HISTORY OF EPILEPSY AND OTHER DISEASES OF THE NERVOUS SYSTEM: ICD-10-CM

## 2021-05-17 DIAGNOSIS — Z80.0 FAMILY HISTORY OF MALIGNANT NEOPLASM OF DIGESTIVE ORGANS: ICD-10-CM

## 2021-05-17 DIAGNOSIS — M19.90 UNSPECIFIED OSTEOARTHRITIS, UNSPECIFIED SITE: ICD-10-CM

## 2021-05-17 DIAGNOSIS — M79.89 OTHER SPECIFIED SOFT TISSUE DISORDERS: ICD-10-CM

## 2021-05-17 LAB
BASOPHILS # BLD AUTO: 0.03 K/UL — SIGNIFICANT CHANGE UP (ref 0–0.2)
BASOPHILS NFR BLD AUTO: 0.8 % — SIGNIFICANT CHANGE UP (ref 0–2)
EOSINOPHIL # BLD AUTO: 0.09 K/UL — SIGNIFICANT CHANGE UP (ref 0–0.5)
EOSINOPHIL NFR BLD AUTO: 2.3 % — SIGNIFICANT CHANGE UP (ref 0–6)
HCT VFR BLD CALC: 43.4 % — SIGNIFICANT CHANGE UP (ref 34.5–45)
HGB BLD-MCNC: 14.3 G/DL — SIGNIFICANT CHANGE UP (ref 11.5–15.5)
IMM GRANULOCYTES NFR BLD AUTO: 0 % — SIGNIFICANT CHANGE UP (ref 0–1.5)
LYMPHOCYTES # BLD AUTO: 2.12 K/UL — SIGNIFICANT CHANGE UP (ref 1–3.3)
LYMPHOCYTES # BLD AUTO: 55.1 % — HIGH (ref 13–44)
MCHC RBC-ENTMCNC: 29.7 PG — SIGNIFICANT CHANGE UP (ref 27–34)
MCHC RBC-ENTMCNC: 32.9 G/DL — SIGNIFICANT CHANGE UP (ref 32–36)
MCV RBC AUTO: 90 FL — SIGNIFICANT CHANGE UP (ref 80–100)
MONOCYTES # BLD AUTO: 0.22 K/UL — SIGNIFICANT CHANGE UP (ref 0–0.9)
MONOCYTES NFR BLD AUTO: 5.7 % — SIGNIFICANT CHANGE UP (ref 2–14)
NEUTROPHILS # BLD AUTO: 1.39 K/UL — LOW (ref 1.8–7.4)
NEUTROPHILS NFR BLD AUTO: 36.1 % — LOW (ref 43–77)
NRBC # BLD: 0 /100 WBCS — SIGNIFICANT CHANGE UP (ref 0–0)
PLATELET # BLD AUTO: 256 K/UL — SIGNIFICANT CHANGE UP (ref 150–400)
RBC # BLD: 4.82 M/UL — SIGNIFICANT CHANGE UP (ref 3.8–5.2)
RBC # FLD: 13.5 % — SIGNIFICANT CHANGE UP (ref 10.3–14.5)
WBC # BLD: 3.85 K/UL — SIGNIFICANT CHANGE UP (ref 3.8–10.5)
WBC # FLD AUTO: 3.85 K/UL — SIGNIFICANT CHANGE UP (ref 3.8–10.5)

## 2021-05-17 PROCEDURE — 99072 ADDL SUPL MATRL&STAF TM PHE: CPT

## 2021-05-17 PROCEDURE — 99205 OFFICE O/P NEW HI 60 MIN: CPT

## 2021-05-18 LAB
DEPRECATED D DIMER PPP IA-ACNC: 209 NG/ML DDU
HCYS SERPL-MCNC: 12 UMOL/L

## 2021-05-19 LAB
AT III PPP CHRO-ACNC: 107 %
B2 GLYCOPROT1 IGA SERPL IA-ACNC: <5 SAU
B2 GLYCOPROT1 IGG SER-ACNC: <5 SGU
B2 GLYCOPROT1 IGM SER-ACNC: <5 SMU
CARDIOLIPIN IGM SER-MCNC: 11.8 MPL
CARDIOLIPIN IGM SER-MCNC: 14.7 GPL
PROT C PPP CHRO-ACNC: 112 %
PROT S AG ACT/NOR PPP IA: 82 %

## 2021-05-20 PROBLEM — Z82.0 FAMILY HISTORY OF PARKINSON'S DISEASE: Status: ACTIVE | Noted: 2021-05-20

## 2021-05-20 PROBLEM — M79.89 LEG SWELLING: Status: RESOLVED | Noted: 2021-05-20 | Resolved: 2021-05-20

## 2021-05-20 PROBLEM — Z83.3 FAMILY HISTORY OF DIABETES MELLITUS: Status: ACTIVE | Noted: 2021-05-20

## 2021-05-20 PROBLEM — Z80.0 FAMILY HISTORY OF MALIGNANT NEOPLASM OF COLON: Status: ACTIVE | Noted: 2021-05-20

## 2021-05-20 PROBLEM — Z78.9 DOES NOT USE TOBACCO: Status: ACTIVE | Noted: 2021-05-20

## 2021-05-20 PROBLEM — M19.90 OTHER TYPE OF OSTEOARTHRITIS: Status: RESOLVED | Noted: 2021-05-20 | Resolved: 2021-05-20

## 2021-05-20 PROBLEM — Z82.49 FAMILY HISTORY OF HYPERTENSION: Status: ACTIVE | Noted: 2021-05-20

## 2021-05-20 PROBLEM — Z78.9 SOCIAL ALCOHOL USE: Status: ACTIVE | Noted: 2021-05-20

## 2021-05-20 LAB
APTT HEX PL PPP: NEGATIVE
HEX-1: 40.7 SECS
HEX-2: 39.2 SECS
PROT S FREE PPP-ACNC: 115 %

## 2021-05-20 NOTE — HISTORY OF PRESENT ILLNESS
[de-identified] : Ms. VILSAINT is referred for an initial hematologic consultation of a possible hypercoagulable state.She is a 71 yo [place] female with past medical history notable for osteoarthritis, psoriasis, obesity, and recurrent DVTs . In July 2019 after flying from [place] to [place] [facility] she was hospitalized with a lower extremity DVT on the right side, had an IVC filter placed and was started on rivaroxaban.  She had an initial hematologic consultation 2 years ago with  at [facility] in [place]; reportedly hypercoagulable work-up was negative and after a few month, patient discontinued anticoagulation.  In October 2020 patient sustained a fall, no fracture, but injury to her coccyx.  Patient has noticed bilateral lower extremity swelling, and subsequently she was diagnosed with bilateral lower extremity DVT, R>L.  She has resumed rivaroxaban ever since.  She has never had a pulmonary embolism. Patient denies family history of venous thrombotic events. She reports symptoms of fatigue, but denies headache, lightheadedness, visual changes, fever, erythromelalgia.  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  Here accompanied by her daughter, Smita. [FreeTextEntry1] : Rivaroxaban

## 2021-05-20 NOTE — CONSULT LETTER
[Dear  ___] : Dear  [unfilled], [Consult Letter:] : I had the pleasure of evaluating your patient, [unfilled]. [Please see my note below.] : Please see my note below. [Consult Closing:] : Thank you very much for allowing me to participate in the care of this patient.  If you have any questions, please do not hesitate to contact me. [Sincerely,] : Sincerely, [FreeTextEntry2] :  Melchor Justin M.D. [FreeTextEntry3] : GREG OBANDO M.D.\par Hematology/ Oncology\par [facility] \par [facility]\par [address]\par [place], [place] [address]\par Telephone: [phone]\par Fax: [phone]\par \par \par \par \par \par  [DrJesus  ___] : Dr. KEENAN

## 2021-05-20 NOTE — REASON FOR VISIT
[Initial Consultation] : an initial consultation for [Family Member] : family member [FreeTextEntry2] : hypercoagulable state

## 2021-05-20 NOTE — REVIEW OF SYSTEMS
[Patient Intake Form Reviewed] : Patient intake form was reviewed [Fever] : no fever [Chills] : no chills [Fatigue] : no fatigue [Recent Change In Weight] : ~T no recent weight change [Joint Pain] : joint pain [Negative] : Allergic/Immunologic [FreeTextEntry9] : Leg swelling

## 2021-05-20 NOTE — ASSESSMENT
[FreeTextEntry1] : Ms. VILSAINT 's questions were answered to her satisfaction. She  expressed her  understanding and willingness to comply with the above recommendations, and  will return to the office in 6 months.\par \par \par \par \par \par \par \par \par \par

## 2021-05-22 LAB
DNA PLOIDY SPEC FC-IMP: NORMAL
PTR INTERP: NORMAL

## 2021-06-01 ENCOUNTER — NON-APPOINTMENT (OUTPATIENT)
Age: 71
End: 2021-06-01

## 2021-06-01 ENCOUNTER — APPOINTMENT (OUTPATIENT)
Dept: DERMATOLOGY | Facility: CLINIC | Age: 71
End: 2021-06-01
Payer: COMMERCIAL

## 2021-06-01 ENCOUNTER — APPOINTMENT (OUTPATIENT)
Dept: INTERNAL MEDICINE | Facility: CLINIC | Age: 71
End: 2021-06-01
Payer: COMMERCIAL

## 2021-06-01 VITALS
SYSTOLIC BLOOD PRESSURE: 153 MMHG | DIASTOLIC BLOOD PRESSURE: 76 MMHG | TEMPERATURE: 97.3 F | WEIGHT: 220 LBS | BODY MASS INDEX: 34.94 KG/M2 | OXYGEN SATURATION: 98 % | HEART RATE: 60 BPM | HEIGHT: 66.61 IN

## 2021-06-01 VITALS — BODY MASS INDEX: 33.34 KG/M2 | HEIGHT: 68 IN | WEIGHT: 220 LBS

## 2021-06-01 DIAGNOSIS — L81.0 POSTINFLAMMATORY HYPERPIGMENTATION: ICD-10-CM

## 2021-06-01 PROCEDURE — 99072 ADDL SUPL MATRL&STAF TM PHE: CPT

## 2021-06-01 PROCEDURE — 99214 OFFICE O/P EST MOD 30 MIN: CPT

## 2021-06-01 PROCEDURE — 99204 OFFICE O/P NEW MOD 45 MIN: CPT

## 2021-06-01 RX ORDER — TRIAMCINOLONE ACETONIDE 1 MG/G
0.1 OINTMENT TOPICAL
Qty: 1 | Refills: 0 | Status: ACTIVE | COMMUNITY
Start: 2021-06-01 | End: 1900-01-01

## 2021-06-03 ENCOUNTER — APPOINTMENT (OUTPATIENT)
Dept: VASCULAR SURGERY | Facility: CLINIC | Age: 71
End: 2021-06-03
Payer: COMMERCIAL

## 2021-06-03 PROCEDURE — 99072 ADDL SUPL MATRL&STAF TM PHE: CPT

## 2021-06-03 PROCEDURE — 99213 OFFICE O/P EST LOW 20 MIN: CPT

## 2021-06-03 PROCEDURE — 93979 VASCULAR STUDY: CPT

## 2021-06-03 NOTE — PLAN
[FreeTextEntry1] : 69 yo woman here for follow-up \par \par HTN- Start HCTZ 25 mg daily. Lytes in 4 weeks. Home log\par \par Knee pain- Have rheum and derm assist whether this is more OA vs PA vs mixed. Willing to start with PMR and PT. Tylenol 500 mg 2/day as needed and ice. Back to swimming\par \par Derm- Appt today\par \par Tremor- Await work-up with neuro\par \par preDM- Follow, congrats on weight lose, continue

## 2021-06-03 NOTE — HISTORY OF PRESENT ILLNESS
[de-identified] : In interim saw Heme. Has appt to see Rheum, Vascular and Derm. Completed mammo\par \par More active. More healthy. Has lost a few lbs. Walking at least 2-3 times a week in park. \par \par Knee pain persists. In the past swimming has helped. Never had injections or did PT consistently.\par \par Tolerating Xarelto. No bruising or bleeding. \par \par

## 2021-06-07 NOTE — END OF VISIT
[FreeTextEntry3] : I, Melchor Justin MD  have read and attest that all the information, medical decision making and discharge instructions within are true and accurate. I personally performed the evaluation and management (E/M) services for this established patient who presents today with (a) chronic problem(s) of (an) existing condition(s).  That E/M includes conducting the examination, assessing all conditions, and establishing a plan of care. Today, my ACP, Marga Martinez PA-C, was here to observe my evaluation and management services for this condition(s) to be followed going forward.

## 2021-06-07 NOTE — HISTORY OF PRESENT ILLNESS
[FreeTextEntry1] : 70 year old female presenting to the office for post op. 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 . No CP/SOB. Will be on Xarelto for life per hematologist. She is with her daughter today who states she sometimes complains of on and off swelling, patient reports no issue.

## 2021-06-07 NOTE — DISCUSSION/SUMMARY
[FreeTextEntry1] : 70 year old female presenting to the office for post op. 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 SHe states she is doing so much better since the procedure, both legs are no longer swollen and feel lighter. No CP/SOB. \par \par Plan:\par Xarelto for life due to extensive DVT\par FU with hematologist at [facility]- Dr. Vazquez\par FU in 6 months

## 2021-06-07 NOTE — PHYSICAL EXAM
[Respiratory Effort] : normal respiratory effort [Normal Heart Sounds] : normal heart sounds [2+] : left 2+ [Calm] : calm [Ankle Swelling (On Exam)] : not present [Varicose Veins Of Lower Extremities] : not present [] : not present [de-identified] : well appearing

## 2021-06-07 NOTE — ASSESSMENT
[FreeTextEntry1] : 70 year old female presenting to the office for follow up. She underwent venogram/lysis on 11/18/2020 and take back the following day for venogram and thrombectomy/venoplasty for IVC thrombus and bilateral iliac DVT. She has a history of DVT in the past and was on short course of Xarelto prior to this. She has a history of IVCF that was placed July 2019 while she was in [place]. US negative for DVT. Filter is permanent filter. Will continue lifelong AC and FU in 6 months. FU sooner with any issue.

## 2021-06-11 ENCOUNTER — RESULT REVIEW (OUTPATIENT)
Age: 71
End: 2021-06-11

## 2021-06-11 ENCOUNTER — APPOINTMENT (OUTPATIENT)
Dept: RADIOLOGY | Facility: CLINIC | Age: 71
End: 2021-06-11
Payer: COMMERCIAL

## 2021-06-11 ENCOUNTER — OUTPATIENT (OUTPATIENT)
Dept: OUTPATIENT SERVICES | Facility: HOSPITAL | Age: 71
LOS: 1 days | End: 2021-06-11
Payer: COMMERCIAL

## 2021-06-11 ENCOUNTER — APPOINTMENT (OUTPATIENT)
Dept: RHEUMATOLOGY | Facility: CLINIC | Age: 71
End: 2021-06-11
Payer: COMMERCIAL

## 2021-06-11 VITALS
BODY MASS INDEX: 32.58 KG/M2 | SYSTOLIC BLOOD PRESSURE: 144 MMHG | HEART RATE: 68 BPM | WEIGHT: 215 LBS | DIASTOLIC BLOOD PRESSURE: 81 MMHG | OXYGEN SATURATION: 98 % | RESPIRATION RATE: 16 BRPM | HEIGHT: 68 IN | TEMPERATURE: 97.9 F

## 2021-06-11 DIAGNOSIS — Z98.891 HISTORY OF UTERINE SCAR FROM PREVIOUS SURGERY: Chronic | ICD-10-CM

## 2021-06-11 DIAGNOSIS — Z95.828 PRESENCE OF OTHER VASCULAR IMPLANTS AND GRAFTS: Chronic | ICD-10-CM

## 2021-06-11 DIAGNOSIS — R76.0 RAISED ANTIBODY TITER: ICD-10-CM

## 2021-06-11 DIAGNOSIS — L40.9 PSORIASIS, UNSPECIFIED: ICD-10-CM

## 2021-06-11 DIAGNOSIS — M25.50 PAIN IN UNSPECIFIED JOINT: ICD-10-CM

## 2021-06-11 PROCEDURE — 72202 X-RAY EXAM SI JOINTS 3/> VWS: CPT | Mod: 26

## 2021-06-11 PROCEDURE — 73522 X-RAY EXAM HIPS BI 3-4 VIEWS: CPT | Mod: 26

## 2021-06-11 PROCEDURE — 99205 OFFICE O/P NEW HI 60 MIN: CPT

## 2021-06-11 PROCEDURE — 73522 X-RAY EXAM HIPS BI 3-4 VIEWS: CPT

## 2021-06-11 PROCEDURE — 72202 X-RAY EXAM SI JOINTS 3/> VWS: CPT

## 2021-06-11 PROCEDURE — 99072 ADDL SUPL MATRL&STAF TM PHE: CPT

## 2021-06-13 PROBLEM — R76.0 LUPUS ANTICOAGULANT POSITIVE: Status: ACTIVE | Noted: 2021-06-13

## 2021-06-15 DIAGNOSIS — Z00.00 ENCOUNTER FOR GENERAL ADULT MEDICAL EXAMINATION W/OUT ABNORMAL FINDINGS: ICD-10-CM

## 2021-06-15 LAB
25(OH)D3 SERPL-MCNC: 32.8 NG/ML
ALBUMIN SERPL ELPH-MCNC: 4.6 G/DL
ALP BLD-CCNC: 83 U/L
ALT SERPL-CCNC: 22 U/L
ANA SER IF-ACNC: NEGATIVE
ANION GAP SERPL CALC-SCNC: 15 MMOL/L
AST SERPL-CCNC: 22 U/L
BASOPHILS # BLD AUTO: 0.04 K/UL
BASOPHILS NFR BLD AUTO: 1 %
BILIRUB SERPL-MCNC: 1.1 MG/DL
BUN SERPL-MCNC: 17 MG/DL
C3 SERPL-MCNC: 159 MG/DL
C4 SERPL-MCNC: 60 MG/DL
CALCIUM SERPL-MCNC: 10.3 MG/DL
CCP AB SER IA-ACNC: <8 UNITS
CENTROMERE IGG SER-ACNC: <0.2 CD:130001892
CHLORIDE SERPL-SCNC: 97 MMOL/L
CO2 SERPL-SCNC: 25 MMOL/L
CREAT SERPL-MCNC: 1.1 MG/DL
CRP SERPL-MCNC: <3 MG/L
DSDNA AB SER-ACNC: <12 IU/ML
ENA RNP AB SER IA-ACNC: <0.2 AL
ENA SCL70 IGG SER IA-ACNC: <0.2 AL
ENA SM AB SER IA-ACNC: <0.2 AL
ENA SS-A AB SER IA-ACNC: <0.2 AL
ENA SS-B AB SER IA-ACNC: <0.2 AL
EOSINOPHIL # BLD AUTO: 0.05 K/UL
EOSINOPHIL NFR BLD AUTO: 1.3 %
ERYTHROCYTE [SEDIMENTATION RATE] IN BLOOD BY WESTERGREN METHOD: 33 MM/HR
HCT VFR BLD CALC: 49.1 %
HGB BLD-MCNC: 15.7 G/DL
IMM GRANULOCYTES NFR BLD AUTO: 0.3 %
LYMPHOCYTES # BLD AUTO: 1.78 K/UL
LYMPHOCYTES NFR BLD AUTO: 46.4 %
MAN DIFF?: NORMAL
MCHC RBC-ENTMCNC: 29.2 PG
MCHC RBC-ENTMCNC: 32 GM/DL
MCV RBC AUTO: 91.3 FL
MONOCYTES # BLD AUTO: 0.26 K/UL
MONOCYTES NFR BLD AUTO: 6.8 %
NEUTROPHILS # BLD AUTO: 1.7 K/UL
NEUTROPHILS NFR BLD AUTO: 44.2 %
PLATELET # BLD AUTO: 302 K/UL
POTASSIUM SERPL-SCNC: 4.8 MMOL/L
PROT SERPL-MCNC: 8 G/DL
RBC # BLD: 5.38 M/UL
RBC # FLD: 13.1 %
RF+CCP IGG SER-IMP: NEGATIVE
RHEUMATOID FACT SER QL: 13 IU/ML
SODIUM SERPL-SCNC: 138 MMOL/L
THYROGLOB AB SERPL-ACNC: <20 IU/ML
THYROPEROXIDASE AB SERPL IA-ACNC: 21.2 IU/ML
TSH SERPL-ACNC: 0.67 UIU/ML
WBC # FLD AUTO: 3.84 K/UL

## 2021-06-29 ENCOUNTER — APPOINTMENT (OUTPATIENT)
Dept: INTERNAL MEDICINE | Facility: CLINIC | Age: 71
End: 2021-06-29
Payer: COMMERCIAL

## 2021-06-29 VITALS
TEMPERATURE: 97.6 F | BODY MASS INDEX: 33.04 KG/M2 | WEIGHT: 218 LBS | HEART RATE: 73 BPM | SYSTOLIC BLOOD PRESSURE: 134 MMHG | HEIGHT: 68 IN | OXYGEN SATURATION: 97 % | DIASTOLIC BLOOD PRESSURE: 74 MMHG

## 2021-06-29 PROCEDURE — 99214 OFFICE O/P EST MOD 30 MIN: CPT

## 2021-06-29 PROCEDURE — 99072 ADDL SUPL MATRL&STAF TM PHE: CPT

## 2021-07-01 NOTE — HISTORY OF PRESENT ILLNESS
[FreeTextEntry1] : Follow-up on chronic issues [de-identified] : Since last appt, she saw Derm, Surg, Rheum. \par \par HTN- 2 a week 135//79. Tolerating meds\par \par OA- Joint pains about the same. Using tylenol 500mg 2-3 a day most days. She is trying to walk more often. Returned to the pool for a few sessions.  \par \par Tremor about the same. Neuro appt came up.\par \par Remains on xarelto, no bleeding or current bruising.\par \par Considering leave of absence. With her symptoms, appts, medications, need for home PT.

## 2021-07-01 NOTE — PLAN
[FreeTextEntry1] : OA- Will increase physical activity, add 2 times a week for 30 in the pool and walking 3 times a week. Keep log. Continue Tylenol 500mg 2-3 a day. If no better back to PT and PMR\par \par DVT- Again emphasized life-long Xarelto 20 mg daily. 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 Neuro Appt 7/30, attempt to get prior records\par \par HTN- Mostly at goal. Continue with HCTZ 25 mg daily. Adjust as needed. Home log more often\par \par Psoriasis- Skin lesions stable. Reviewed derm note with pt again

## 2021-07-01 NOTE — PHYSICAL EXAM
[No Acute Distress] : no acute distress [Supple] : supple [No Respiratory Distress] : no respiratory distress  [No Accessory Muscle Use] : no accessory muscle use [Clear to Auscultation] : lungs were clear to auscultation bilaterally [Normal Rate] : normal rate  [Regular Rhythm] : with a regular rhythm [Normal S1, S2] : normal S1 and S2 [Soft] : abdomen soft [Non Tender] : non-tender [Alert and Oriented x3] : oriented to person, place, and time [de-identified] : +2 edema bl [de-identified] : skin lesions stable [de-identified] : flat affect

## 2021-07-16 LAB
BASOPHILS # BLD AUTO: 0.03 K/UL
BASOPHILS NFR BLD AUTO: 0.8 %
EOSINOPHIL # BLD AUTO: 0.06 K/UL
EOSINOPHIL NFR BLD AUTO: 1.5 %
HCT VFR BLD CALC: 44.4 %
HGB BLD-MCNC: 14.9 G/DL
IMM GRANULOCYTES NFR BLD AUTO: 0.3 %
LYMPHOCYTES # BLD AUTO: 2 K/UL
LYMPHOCYTES NFR BLD AUTO: 51.5 %
MAN DIFF?: NORMAL
MCHC RBC-ENTMCNC: 29.9 PG
MCHC RBC-ENTMCNC: 33.6 GM/DL
MCV RBC AUTO: 89 FL
MONOCYTES # BLD AUTO: 0.26 K/UL
MONOCYTES NFR BLD AUTO: 6.7 %
NEUTROPHILS # BLD AUTO: 1.52 K/UL
NEUTROPHILS NFR BLD AUTO: 39.2 %
PLATELET # BLD AUTO: 278 K/UL
RBC # BLD: 4.99 M/UL
RBC # FLD: 12.8 %
RNA POLYMERASE III IGG: 42 UNITS
WBC # FLD AUTO: 3.88 K/UL

## 2021-09-16 ENCOUNTER — OUTPATIENT (OUTPATIENT)
Dept: OUTPATIENT SERVICES | Facility: HOSPITAL | Age: 71
LOS: 1 days | Discharge: HOME | End: 2021-09-16

## 2021-09-16 DIAGNOSIS — Z95.828 PRESENCE OF OTHER VASCULAR IMPLANTS AND GRAFTS: Chronic | ICD-10-CM

## 2021-09-16 DIAGNOSIS — D68.9 COAGULATION DEFECT, UNSPECIFIED: ICD-10-CM

## 2021-09-16 DIAGNOSIS — Z98.891 HISTORY OF UTERINE SCAR FROM PREVIOUS SURGERY: Chronic | ICD-10-CM

## 2021-09-20 ENCOUNTER — APPOINTMENT (OUTPATIENT)
Dept: HEMATOLOGY ONCOLOGY | Facility: CLINIC | Age: 71
End: 2021-09-20

## 2021-09-20 ENCOUNTER — APPOINTMENT (OUTPATIENT)
Age: 71
End: 2021-09-20
Payer: COMMERCIAL

## 2021-09-20 VITALS
HEART RATE: 56 BPM | HEIGHT: 68 IN | SYSTOLIC BLOOD PRESSURE: 147 MMHG | WEIGHT: 220.46 LBS | TEMPERATURE: 97.4 F | OXYGEN SATURATION: 98 % | RESPIRATION RATE: 16 BRPM | BODY MASS INDEX: 33.41 KG/M2 | DIASTOLIC BLOOD PRESSURE: 80 MMHG

## 2021-09-20 PROCEDURE — 99214 OFFICE O/P EST MOD 30 MIN: CPT

## 2021-09-20 NOTE — PHYSICAL EXAM
[Ambulatory and capable of all self care but unable to carry out any work activities] : Status 2- Ambulatory and capable of all self care but unable to carry out any work activities. Up and about more than 50% of waking hours [Obese] : obese [Normal] : normal appearance, no rash, nodules, vesicles, ulcers, erythema [de-identified] : LE edema b/l

## 2021-09-20 NOTE — REVIEW OF SYSTEMS
[Fever] : no fever [Chills] : no chills [Fatigue] : fatigue [Recent Change In Weight] : ~T no recent weight change [Joint Pain] : joint pain [Joint Stiffness] : joint stiffness [Negative] : Psychiatric [FreeTextEntry9] : b/l leg swelling

## 2021-09-20 NOTE — HISTORY OF PRESENT ILLNESS
[de-identified] : 71F with PMHx of osteoarthritis, psoriasis, obesity, and recurrent DVTs, returning for hematologic follow-up. [FreeTextEntry1] : Rivaroxaban [de-identified] : Patient initially seen in consultation in May 2021 for recurrent , unprovoked bilateral DVTs, had a thorough hypercoagulable work-up including molecular testing for factor V Leiden and prothrombin gene mutations, all of which were negative.  Patient has been compliant with anticoagulation (presently taking Xarelto 20 mg daily; medication list reviewed.  Had recent extensive rheumatologic work-up for generalized arthritic pain.  Patient continues to work, denies new constitutional symptoms lower extremity swelling or pain, dyspnea on exertion, etc.  Ports no recent traveling, no exposure to sick family members.  Accompanied by daughter, Smita.

## 2021-09-20 NOTE — REASON FOR VISIT
[Follow-Up Visit] : a follow-up visit for [Family Member] : family member [FreeTextEntry2] : hypercoagulable state

## 2021-10-14 ENCOUNTER — APPOINTMENT (OUTPATIENT)
Dept: INTERNAL MEDICINE | Facility: CLINIC | Age: 71
End: 2021-10-14

## 2021-10-14 LAB — SARS-COV-2 N GENE NPH QL NAA+PROBE: NOT DETECTED

## 2021-10-15 NOTE — ED ADULT NURSE NOTE - OBJECTIVE STATEMENT
Pt presents to rm 15,. A&Ox4, ambulatory w/o assistance, pmhx of PE and DVT on eliquis, sent by PCP for further evaluation of recent PE's in [place], pt states she had a "filter" placed and was started on eliquis. Denies chest pain, shortness of breath, palpitations, diaphoresis, headaches, fevers, dizziness, nausea, vomiting, diarrhea, or urinary symptoms at this time. IV established in left hand with a 20G, labs drawn and sent, call bell in reach, warm blanket provided, bed in lowest position, side rails up x2, MD evaluation in progress. Will continue to monitor.
Airborne/Contact

## 2021-11-02 ENCOUNTER — NON-APPOINTMENT (OUTPATIENT)
Age: 71
End: 2021-11-02

## 2021-11-18 ENCOUNTER — APPOINTMENT (OUTPATIENT)
Dept: INTERNAL MEDICINE | Facility: CLINIC | Age: 71
End: 2021-11-18
Payer: COMMERCIAL

## 2021-11-18 VITALS
OXYGEN SATURATION: 97 % | SYSTOLIC BLOOD PRESSURE: 122 MMHG | BODY MASS INDEX: 33.19 KG/M2 | WEIGHT: 219 LBS | HEIGHT: 68 IN | TEMPERATURE: 97.3 F | DIASTOLIC BLOOD PRESSURE: 75 MMHG | HEART RATE: 62 BPM

## 2021-11-18 DIAGNOSIS — Z23 ENCOUNTER FOR IMMUNIZATION: ICD-10-CM

## 2021-11-18 PROCEDURE — 99214 OFFICE O/P EST MOD 30 MIN: CPT | Mod: 25

## 2021-11-18 PROCEDURE — 90662 IIV NO PRSV INCREASED AG IM: CPT

## 2021-11-18 PROCEDURE — G0008: CPT

## 2021-11-18 NOTE — PLAN
[FreeTextEntry1] : 72 yo woman here for follow-up on chronic issues\par HTN- At goal, remain on HCTZ 25 mg daily, lytes\par \par OA- Overall better but still some room for improvement. Start HET and willing to start PT. Tylenol prn\par \par Tremor- Await movement disorder appt\par \par DVT/PE- Lifelong meds, doing well\par \par HCM\par flu vaccine today

## 2021-11-18 NOTE — PHYSICAL EXAM
[No Acute Distress] : no acute distress [Supple] : supple [No Respiratory Distress] : no respiratory distress  [No Accessory Muscle Use] : no accessory muscle use [Clear to Auscultation] : lungs were clear to auscultation bilaterally [Normal Rate] : normal rate  [Regular Rhythm] : with a regular rhythm [Normal S1, S2] : normal S1 and S2 [Soft] : abdomen soft [Speech Grossly Normal] : speech grossly normal [Alert and Oriented x3] : oriented to person, place, and time [de-identified] : with daughter [de-identified] : +1 edema, using compression stockings [de-identified] : using walker

## 2021-11-18 NOTE — HISTORY OF PRESENT ILLNESS
[de-identified] : Has a bike and treadmill at home yet has not tried it. Was walking outside but with weather changes back indoors. Continues to use rolling walker. \par \par HTN- At goal.  HCTZ 25 mg. Doing DASH diet\par \par OA better\par \par Tremor the same- awaiting neuro [facility] appt\par \par Told needs to do FMLA forms retroactive. Last day in office was 7/30/21. Thinks had short term disability from 7/30/21 til end of October. Mentally wants to return to work but physically is not sure she can do it.

## 2021-11-24 LAB
25(OH)D3 SERPL-MCNC: 33.6 NG/ML
ANION GAP SERPL CALC-SCNC: 18 MMOL/L
BASOPHILS # BLD AUTO: 0.03 K/UL
BASOPHILS NFR BLD AUTO: 0.8 %
BUN SERPL-MCNC: 13 MG/DL
CALCIUM SERPL-MCNC: 9.7 MG/DL
CHLORIDE SERPL-SCNC: 103 MMOL/L
CHOLEST SERPL-MCNC: 241 MG/DL
CO2 SERPL-SCNC: 20 MMOL/L
CREAT SERPL-MCNC: 0.93 MG/DL
EOSINOPHIL # BLD AUTO: 0.09 K/UL
EOSINOPHIL NFR BLD AUTO: 2.4 %
ESTIMATED AVERAGE GLUCOSE: 131 MG/DL
GLUCOSE SERPL-MCNC: 108 MG/DL
HBA1C MFR BLD HPLC: 6.2 %
HCT VFR BLD CALC: 46.6 %
HDLC SERPL-MCNC: 61 MG/DL
HGB BLD-MCNC: 15 G/DL
IMM GRANULOCYTES NFR BLD AUTO: 0.3 %
LDLC SERPL CALC-MCNC: 160 MG/DL
LYMPHOCYTES # BLD AUTO: 1.82 K/UL
LYMPHOCYTES NFR BLD AUTO: 49.3 %
MAN DIFF?: NORMAL
MCHC RBC-ENTMCNC: 29.4 PG
MCHC RBC-ENTMCNC: 32.2 GM/DL
MCV RBC AUTO: 91.4 FL
MONOCYTES # BLD AUTO: 0.24 K/UL
MONOCYTES NFR BLD AUTO: 6.5 %
NEUTROPHILS # BLD AUTO: 1.5 K/UL
NEUTROPHILS NFR BLD AUTO: 40.7 %
NONHDLC SERPL-MCNC: 180 MG/DL
PLATELET # BLD AUTO: 288 K/UL
POTASSIUM SERPL-SCNC: 4 MMOL/L
RBC # BLD: 5.1 M/UL
RBC # FLD: 13.4 %
SODIUM SERPL-SCNC: 141 MMOL/L
TRIGL SERPL-MCNC: 101 MG/DL
TSH SERPL-ACNC: 0.63 UIU/ML
VIT B12 SERPL-MCNC: 400 PG/ML
WBC # FLD AUTO: 3.69 K/UL

## 2021-12-09 ENCOUNTER — APPOINTMENT (OUTPATIENT)
Dept: RADIOLOGY | Facility: CLINIC | Age: 71
End: 2021-12-09
Payer: COMMERCIAL

## 2021-12-09 ENCOUNTER — APPOINTMENT (OUTPATIENT)
Dept: INTERNAL MEDICINE | Facility: CLINIC | Age: 71
End: 2021-12-09
Payer: COMMERCIAL

## 2021-12-09 ENCOUNTER — RESULT REVIEW (OUTPATIENT)
Age: 71
End: 2021-12-09

## 2021-12-09 VITALS
DIASTOLIC BLOOD PRESSURE: 80 MMHG | WEIGHT: 216 LBS | TEMPERATURE: 97.8 F | OXYGEN SATURATION: 98 % | BODY MASS INDEX: 32.74 KG/M2 | HEIGHT: 68 IN | HEART RATE: 67 BPM | SYSTOLIC BLOOD PRESSURE: 137 MMHG

## 2021-12-09 DIAGNOSIS — Z13.820 ENCOUNTER FOR SCREENING FOR OSTEOPOROSIS: ICD-10-CM

## 2021-12-09 PROCEDURE — G0444 DEPRESSION SCREEN ANNUAL: CPT | Mod: 59

## 2021-12-09 PROCEDURE — 77080 DXA BONE DENSITY AXIAL: CPT

## 2021-12-09 PROCEDURE — 99397 PER PM REEVAL EST PAT 65+ YR: CPT

## 2021-12-09 PROCEDURE — G0442 ANNUAL ALCOHOL SCREEN 15 MIN: CPT

## 2021-12-14 NOTE — HISTORY OF PRESENT ILLNESS
[de-identified] : Here for annual wellness exam. No new issues. On going chronic issues. Hopes to return to work on 12/13.\par HCM\par DEXA scheduled\par mammo 5/21\par Planned COVID 3rd dose\par \par

## 2021-12-14 NOTE — PLAN
[FreeTextEntry1] : Addressed age appropriate screenings\par HCM- reminded about the below\par optho\par fiobt\par dexa\par Completed letter for work to return to work\par \par Movement disorder- Still waiting for neuro movement disorder appt\par \par preDM- minimize bread, rice, A1C q3 months to inform next steps\par \par

## 2021-12-14 NOTE — HEALTH RISK ASSESSMENT
[Fair] : ~his/her~ current health as fair  [Good] : ~his/her~  mood as  good [] : No [No] : In the past 12 months have you used drugs other than those required for medical reasons? No [No falls in past year] : Patient reported no falls in the past year [0] : 2) Feeling down, depressed, or hopeless: Not at all (0) [PHQ-2 Negative - No further assessment needed] : PHQ-2 Negative - No further assessment needed [de-identified] : remains sedentary [de-identified] : lots of carbs and sweets [Patient reported mammogram was normal] : Patient reported mammogram was normal [Patient reported bone density results were normal] : Patient reported bone density results were normal [Change in mental status noted] : No change in mental status noted [Language] : denies difficulty with language [Behavior] : denies difficulty with behavior [Reasoning] : denies difficulty with reasoning [Behavioral] : behavioral [Cultural] : cultural [Financial] : financial [With Family] : lives with family [Employed] : employed [Sexually Active] : not sexually active [High Risk Behavior] : no high risk behavior [Feels Safe at Home] : Feels safe at home [Fully functional (bathing, dressing, toileting, transferring, walking, feeding)] : Fully functional (bathing, dressing, toileting, transferring, walking, feeding) [Fully functional (using the telephone, shopping, preparing meals, housekeeping, doing laundry, using] : Fully functional and needs no help or supervision to perform IADLs (using the telephone, shopping, preparing meals, housekeeping, doing laundry, using transportation, managing medications and managing finances) [MammogramDate] : 05/21 [BoneDensityDate] : 12/21 [de-identified] : Mostly independent, daughter does help with some items

## 2021-12-14 NOTE — PHYSICAL EXAM
[No Acute Distress] : no acute distress [EOMI] : extraocular movements intact [Supple] : supple [No Respiratory Distress] : no respiratory distress  [No Accessory Muscle Use] : no accessory muscle use [Clear to Auscultation] : lungs were clear to auscultation bilaterally [Normal Rate] : normal rate  [Regular Rhythm] : with a regular rhythm [Normal S1, S2] : normal S1 and S2 [Soft] : abdomen soft [Non Tender] : non-tender [No HSM] : no HSM [Speech Grossly Normal] : speech grossly normal [Alert and Oriented x3] : oriented to person, place, and time [de-identified] : bl trace edema, better

## 2022-03-01 LAB — HEMOCCULT STL QL IA: NEGATIVE

## 2022-03-16 ENCOUNTER — OUTPATIENT (OUTPATIENT)
Dept: OUTPATIENT SERVICES | Facility: HOSPITAL | Age: 72
LOS: 1 days | Discharge: HOME | End: 2022-03-16

## 2022-03-16 DIAGNOSIS — Z98.891 HISTORY OF UTERINE SCAR FROM PREVIOUS SURGERY: Chronic | ICD-10-CM

## 2022-03-16 DIAGNOSIS — Z95.828 PRESENCE OF OTHER VASCULAR IMPLANTS AND GRAFTS: Chronic | ICD-10-CM

## 2022-03-16 DIAGNOSIS — D68.9 COAGULATION DEFECT, UNSPECIFIED: ICD-10-CM

## 2022-03-24 ENCOUNTER — NON-APPOINTMENT (OUTPATIENT)
Age: 72
End: 2022-03-24

## 2022-03-24 ENCOUNTER — APPOINTMENT (OUTPATIENT)
Dept: INTERNAL MEDICINE | Facility: CLINIC | Age: 72
End: 2022-03-24
Payer: COMMERCIAL

## 2022-03-24 VITALS
DIASTOLIC BLOOD PRESSURE: 77 MMHG | WEIGHT: 211 LBS | HEART RATE: 69 BPM | TEMPERATURE: 97.9 F | OXYGEN SATURATION: 97 % | BODY MASS INDEX: 31.98 KG/M2 | HEIGHT: 68 IN | SYSTOLIC BLOOD PRESSURE: 131 MMHG

## 2022-03-24 DIAGNOSIS — M25.50 PAIN IN UNSPECIFIED JOINT: ICD-10-CM

## 2022-03-24 PROCEDURE — 99214 OFFICE O/P EST MOD 30 MIN: CPT

## 2022-03-24 NOTE — PHYSICAL EXAM
[No Acute Distress] : no acute distress [No Respiratory Distress] : no respiratory distress  [No Accessory Muscle Use] : no accessory muscle use [Clear to Auscultation] : lungs were clear to auscultation bilaterally [Normal Rate] : normal rate  [Regular Rhythm] : with a regular rhythm [Normal S1, S2] : normal S1 and S2 [Normal Gait] : normal gait [Soft] : abdomen soft [Alert and Oriented x3] : oriented to person, place, and time [de-identified] : +2 edema BL [de-identified] : has walker, slow gait

## 2022-03-24 NOTE — PLAN
[FreeTextEntry1] : DVT/PE- Lifelong eliquis. Tolerating\par \par HTN- Borderline, home log and adjust as needed\par \par Tremor- Remind to see neuro, start PT\par \par Knee OA- PT\par \par PreDM- Congrats on weight loss. Address meds based on A1C\par \par HCM\par Remind of vaccines

## 2022-03-24 NOTE — HISTORY OF PRESENT ILLNESS
[de-identified] : Follow-up on chronic issues\par Knee OA- Walking at the park. About 10 mins straight and then stops. Knee pain stops her from walking further. Denies any cardiopulm symptoms.\par \par DVT/PE- Xarelto, no bruising or bleeding\par \par HTN- Range 120-140/80s. Mostly mid 130s/80s, off memory. Forgot log.\par \par Neuro - Stable tremor. Still has not scheduled appt to see movement disorder neurologist

## 2022-03-30 LAB
25(OH)D3 SERPL-MCNC: 39.8 NG/ML
ALBUMIN SERPL ELPH-MCNC: 4.9 G/DL
ALP BLD-CCNC: 78 U/L
ALT SERPL-CCNC: 14 U/L
ANION GAP SERPL CALC-SCNC: 23 MMOL/L
AST SERPL-CCNC: 18 U/L
BASOPHILS # BLD AUTO: 0.04 K/UL
BASOPHILS NFR BLD AUTO: 1 %
BILIRUB DIRECT SERPL-MCNC: 0.2 MG/DL
BILIRUB INDIRECT SERPL-MCNC: 0.7 MG/DL
BILIRUB SERPL-MCNC: 0.9 MG/DL
BUN SERPL-MCNC: 17 MG/DL
CALCIUM SERPL-MCNC: 10.5 MG/DL
CHLORIDE SERPL-SCNC: 101 MMOL/L
CHOLEST SERPL-MCNC: 261 MG/DL
CO2 SERPL-SCNC: 19 MMOL/L
CREAT SERPL-MCNC: 0.95 MG/DL
EGFR: 64 ML/MIN/1.73M2
EOSINOPHIL # BLD AUTO: 0.05 K/UL
EOSINOPHIL NFR BLD AUTO: 1.2 %
ESTIMATED AVERAGE GLUCOSE: 126 MG/DL
GLUCOSE SERPL-MCNC: 95 MG/DL
HBA1C MFR BLD HPLC: 6 %
HCT VFR BLD CALC: 49.6 %
HDLC SERPL-MCNC: 80 MG/DL
HGB BLD-MCNC: 15.8 G/DL
IMM GRANULOCYTES NFR BLD AUTO: 0 %
LDLC SERPL CALC-MCNC: 164 MG/DL
LYMPHOCYTES # BLD AUTO: 2.08 K/UL
LYMPHOCYTES NFR BLD AUTO: 51.9 %
MAN DIFF?: NORMAL
MCHC RBC-ENTMCNC: 29.4 PG
MCHC RBC-ENTMCNC: 31.9 GM/DL
MCV RBC AUTO: 92.4 FL
MONOCYTES # BLD AUTO: 0.23 K/UL
MONOCYTES NFR BLD AUTO: 5.7 %
NEUTROPHILS # BLD AUTO: 1.61 K/UL
NEUTROPHILS NFR BLD AUTO: 40.2 %
NONHDLC SERPL-MCNC: 181 MG/DL
PLATELET # BLD AUTO: 283 K/UL
POTASSIUM SERPL-SCNC: 3.9 MMOL/L
PROT SERPL-MCNC: 8.1 G/DL
RBC # BLD: 5.37 M/UL
RBC # FLD: 13.3 %
SODIUM SERPL-SCNC: 143 MMOL/L
TRIGL SERPL-MCNC: 85 MG/DL
TSH SERPL-ACNC: 0.66 UIU/ML
VIT B12 SERPL-MCNC: 367 PG/ML
WBC # FLD AUTO: 4.01 K/UL

## 2022-04-14 ENCOUNTER — APPOINTMENT (OUTPATIENT)
Age: 72
End: 2022-04-14

## 2022-04-29 ENCOUNTER — APPOINTMENT (OUTPATIENT)
Dept: CARDIOLOGY | Facility: CLINIC | Age: 72
End: 2022-04-29
Payer: COMMERCIAL

## 2022-04-29 DIAGNOSIS — R01.1 CARDIAC MURMUR, UNSPECIFIED: ICD-10-CM

## 2022-04-29 DIAGNOSIS — R93.1 ABNORMAL FINDINGS ON DIAGNOSTIC IMAGING OF HEART AND CORONARY CIRCULATION: ICD-10-CM

## 2022-04-29 PROCEDURE — 93306 TTE W/DOPPLER COMPLETE: CPT

## 2022-05-03 ENCOUNTER — OUTPATIENT (OUTPATIENT)
Dept: OUTPATIENT SERVICES | Facility: HOSPITAL | Age: 72
LOS: 1 days | Discharge: HOME | End: 2022-05-03

## 2022-05-03 DIAGNOSIS — Z98.891 HISTORY OF UTERINE SCAR FROM PREVIOUS SURGERY: Chronic | ICD-10-CM

## 2022-05-03 DIAGNOSIS — D68.9 COAGULATION DEFECT, UNSPECIFIED: ICD-10-CM

## 2022-05-03 DIAGNOSIS — Z95.828 PRESENCE OF OTHER VASCULAR IMPLANTS AND GRAFTS: Chronic | ICD-10-CM

## 2022-05-03 PROBLEM — R93.1 ABNORMAL ECHOCARDIOGRAM: Status: ACTIVE | Noted: 2022-05-03

## 2022-05-05 ENCOUNTER — APPOINTMENT (OUTPATIENT)
Age: 72
End: 2022-05-05

## 2022-05-12 ENCOUNTER — APPOINTMENT (OUTPATIENT)
Dept: INTERNAL MEDICINE | Facility: CLINIC | Age: 72
End: 2022-05-12

## 2022-05-20 ENCOUNTER — APPOINTMENT (OUTPATIENT)
Dept: SLEEP CENTER | Facility: CLINIC | Age: 72
End: 2022-05-20

## 2022-05-22 PROBLEM — R01.1 MURMUR: Status: ACTIVE | Noted: 2022-05-22

## 2022-05-31 ENCOUNTER — RX RENEWAL (OUTPATIENT)
Age: 72
End: 2022-05-31

## 2022-06-10 ENCOUNTER — OUTPATIENT (OUTPATIENT)
Dept: OUTPATIENT SERVICES | Facility: HOSPITAL | Age: 72
LOS: 1 days | Discharge: HOME | End: 2022-06-10

## 2022-06-10 DIAGNOSIS — D68.9 COAGULATION DEFECT, UNSPECIFIED: ICD-10-CM

## 2022-06-10 DIAGNOSIS — Z95.828 PRESENCE OF OTHER VASCULAR IMPLANTS AND GRAFTS: Chronic | ICD-10-CM

## 2022-06-10 DIAGNOSIS — Z98.891 HISTORY OF UTERINE SCAR FROM PREVIOUS SURGERY: Chronic | ICD-10-CM

## 2022-06-16 ENCOUNTER — APPOINTMENT (OUTPATIENT)
Age: 72
End: 2022-06-16
Payer: COMMERCIAL

## 2022-06-16 VITALS
WEIGHT: 213.83 LBS | SYSTOLIC BLOOD PRESSURE: 146 MMHG | HEIGHT: 67.99 IN | BODY MASS INDEX: 32.41 KG/M2 | TEMPERATURE: 97.2 F | HEART RATE: 78 BPM | DIASTOLIC BLOOD PRESSURE: 84 MMHG | RESPIRATION RATE: 16 BRPM | OXYGEN SATURATION: 94 %

## 2022-06-16 PROCEDURE — 99213 OFFICE O/P EST LOW 20 MIN: CPT

## 2022-06-17 NOTE — HISTORY OF PRESENT ILLNESS
[de-identified] : 71F with PMHx of osteoarthritis, psoriasis, obesity, and recurrent DVTs, returning for hematologic follow-up. [FreeTextEntry1] : Rivaroxaban [de-identified] : Returning for follow-up of bilateral, unprovoked DVTs, in the setting of an unremarkable hypercoagulable work-up.  Last seen in the office in September 2021, compliant with Xarelto, reporting no new constitutional symptoms or bleeding diathesis.  Denies new venous thrombotic events.  Complains of bilateral knee swelling and difficulty walking, but tries to remain physically active; wearing compressive stockings.  Denies falls, fractures, hospitalizations; denies bleeding tendency.  Had a few weeks of physical therapy; continues to practice recommended exercises.

## 2022-06-17 NOTE — PHYSICAL EXAM
[Ambulatory and capable of all self care but unable to carry out any work activities] : Status 2- Ambulatory and capable of all self care but unable to carry out any work activities. Up and about more than 50% of waking hours [Obese] : obese [Normal] : normal appearance, no rash, nodules, vesicles, ulcers, erythema [de-identified] : LE edema b/l

## 2022-06-17 NOTE — ASSESSMENT
[FreeTextEntry1] : Ms. VILSAINT 's questions were answered to her satisfaction. She  expressed her  understanding and willingness to comply with the above recommendations, and  will return to the office in 6 months.\par \par \par \par \par \par \par \par \par \par \par

## 2022-06-17 NOTE — REVIEW OF SYSTEMS
[Fatigue] : fatigue [Joint Pain] : joint pain [Joint Stiffness] : joint stiffness [Negative] : Psychiatric [Fever] : no fever [Chills] : no chills [Recent Change In Weight] : ~T no recent weight change [FreeTextEntry9] : b/l leg swelling; arthritic knee pain

## 2022-06-17 NOTE — RESULTS/DATA
PHYSICAL THERAPY - DAILY TREATMENT NOTE    Patient Name: Belvin Burkitt        Date: 3/26/2018  : 1936   yes Patient  Verified  Visit #:    Insurance: Payor: [place] Payor / Plan: [place] MEDICARE PART A & B / Product Type: Medicare /      In time:  Out time: 1200   Total Treatment Time: 30     Medicare Time Tracking (below)   Total Timed Codes (min):  30 1:1 Treatment Time: 30     TREATMENT AREA =  Unsteadiness on feet [R26.81]    SUBJECTIVE  Pain Level (on 0 to 10 scale): 0 / 10   Medication Changes/New allergies or changes in medical history, any new surgeries or procedures?    no  If yes, update Summary List   Subjective Functional Status/Changes:  []  No changes reported     Pt reports feeling a little better then last visit. OBJECTIVE    10 min Therapeutic Exercise:  []  See flow sheet :   Rationale: increase strength and improve balance  to improve the patients ability to perform functional ADL's    20 min Neuromuscular Re-ed:  See flow sheet. Rationale:    improve coordination, increase proprioception and balance to improve the patients ability to perform *functional ADL's       min Patient Education:  yes  Reviewed HEP   []  Progressed/Changed HEP based on:        Other Objective/Functional Measures:    See flow sheet for therex  CGA for all balance with EC  Moderate cuing for head turns and changing directions  Will educate patient on DC HEP NV       Post Treatment Pain Level (on 0 to 10) scale:  0 / 10     ASSESSMENT  Assessment/Changes in Function:   DC NV     []  See Progress Note/Recertification   Patient will continue to benefit from skilled PT services to modify and progress therapeutic interventions, address functional mobility deficits, address ROM deficits, address strength deficits, analyze and address soft tissue restrictions, analyze and cue movement patterns, analyze and modify body mechanics/ergonomics, assess and modify postural abnormalities and address imbalance/dizziness to attain remaining goals.    Progress toward goals / Updated goals:       PLAN  []  Upgrade activities as tolerated yes Continue plan of care   []  Discharge due to :    [x]  Other: DC NV     Therapist: Debby Tucker PT, DPT    Date: 3/26/2018 Time: 3:30PM     Future Appointments  Date Time Provider Ivon Hall   3/26/2018 11:30 AM Srinath Ngo PT [facility]   3/29/2018 11:00 AM Srinivas Rascon PT [facility] [FreeTextEntry1] : I reviewed recent blood work results with patient.\par \par \par

## 2022-07-12 ENCOUNTER — APPOINTMENT (OUTPATIENT)
Dept: INTERNAL MEDICINE | Facility: CLINIC | Age: 72
End: 2022-07-12

## 2022-07-12 VITALS
DIASTOLIC BLOOD PRESSURE: 70 MMHG | WEIGHT: 213 LBS | HEART RATE: 64 BPM | BODY MASS INDEX: 32.66 KG/M2 | SYSTOLIC BLOOD PRESSURE: 142 MMHG | TEMPERATURE: 97.3 F | OXYGEN SATURATION: 97 % | HEIGHT: 67.9 IN

## 2022-07-12 DIAGNOSIS — Z86.718 PERSONAL HISTORY OF OTHER VENOUS THROMBOSIS AND EMBOLISM: ICD-10-CM

## 2022-07-12 PROCEDURE — 99214 OFFICE O/P EST MOD 30 MIN: CPT

## 2022-07-15 LAB
25(OH)D3 SERPL-MCNC: 31.3 NG/ML
ALBUMIN SERPL ELPH-MCNC: 4.5 G/DL
ALP BLD-CCNC: 70 U/L
ALT SERPL-CCNC: 13 U/L
ANION GAP SERPL CALC-SCNC: 11 MMOL/L
AST SERPL-CCNC: 14 U/L
BASOPHILS # BLD AUTO: 0.03 K/UL
BASOPHILS NFR BLD AUTO: 0.8 %
BILIRUB DIRECT SERPL-MCNC: 0.2 MG/DL
BILIRUB INDIRECT SERPL-MCNC: 0.8 MG/DL
BILIRUB SERPL-MCNC: 1 MG/DL
BUN SERPL-MCNC: 15 MG/DL
CALCIUM SERPL-MCNC: 10.5 MG/DL
CHLORIDE SERPL-SCNC: 103 MMOL/L
CO2 SERPL-SCNC: 27 MMOL/L
CREAT SERPL-MCNC: 0.96 MG/DL
EGFR: 64 ML/MIN/1.73M2
EOSINOPHIL # BLD AUTO: 0.09 K/UL
EOSINOPHIL NFR BLD AUTO: 2.3 %
ESTIMATED AVERAGE GLUCOSE: 128 MG/DL
GLUCOSE SERPL-MCNC: 95 MG/DL
HBA1C MFR BLD HPLC: 6.1 %
HCT VFR BLD CALC: 47.1 %
HGB BLD-MCNC: 15.4 G/DL
IMM GRANULOCYTES NFR BLD AUTO: 0 %
LYMPHOCYTES # BLD AUTO: 2.08 K/UL
LYMPHOCYTES NFR BLD AUTO: 52.4 %
MAN DIFF?: NORMAL
MCHC RBC-ENTMCNC: 29.7 PG
MCHC RBC-ENTMCNC: 32.7 GM/DL
MCV RBC AUTO: 90.9 FL
MONOCYTES # BLD AUTO: 0.26 K/UL
MONOCYTES NFR BLD AUTO: 6.5 %
NEUTROPHILS # BLD AUTO: 1.51 K/UL
NEUTROPHILS NFR BLD AUTO: 38 %
PLATELET # BLD AUTO: 263 K/UL
POTASSIUM SERPL-SCNC: 4 MMOL/L
PROT SERPL-MCNC: 7.4 G/DL
RBC # BLD: 5.18 M/UL
RBC # FLD: 13.7 %
SODIUM SERPL-SCNC: 141 MMOL/L
TSH SERPL-ACNC: 0.95 UIU/ML
WBC # FLD AUTO: 3.97 K/UL

## 2022-07-28 NOTE — PLAN
[FreeTextEntry1] : HTN- Borderline high. Willing to add 2nd agent. Now on HCTZ 25 mg and Lisinopril 20 mg. Lytes in 2-4 weeks\par \par DVT- Discussed Heme notes, wants to remain on same dose- Xarelto 20 mg. Readdress with Heme\par \par Tremor- About the same, remind to see neuro\par \par Sleep study- Remind\par \par OA- Back to PT. Tylenol 500mg 2 a day, rest, stretches as needed\par \par

## 2022-07-28 NOTE — PHYSICAL EXAM
[No Acute Distress] : no acute distress [No Respiratory Distress] : no respiratory distress  [No Accessory Muscle Use] : no accessory muscle use [Clear to Auscultation] : lungs were clear to auscultation bilaterally [Normal Rate] : normal rate  [Regular Rhythm] : with a regular rhythm [Normal S1, S2] : normal S1 and S2 [Soft] : abdomen soft [No Spinal Tenderness] : no spinal tenderness [Alert and Oriented x3] : oriented to person, place, and time [de-identified] : +edema bl, slightly asymm

## 2022-07-28 NOTE — HISTORY OF PRESENT ILLNESS
[FreeTextEntry1] : Follow-up on chronic issue [de-identified] : Saw Heme. Lower dose recommended. Feeling frightened to reduce dose. Scared of a repeat clot.\par \par Started PT and too far, so only went to a few sessions. Still needs help with mobility and some MSK symptoms.\par \par Sleep study still needs to be rescheduled \par \par Awaiting f-up neuro appt as well.

## 2022-08-29 ENCOUNTER — RX RENEWAL (OUTPATIENT)
Age: 72
End: 2022-08-29

## 2022-09-21 ENCOUNTER — APPOINTMENT (OUTPATIENT)
Dept: INTERNAL MEDICINE | Facility: CLINIC | Age: 72
End: 2022-09-21

## 2022-09-21 VITALS
TEMPERATURE: 97.2 F | OXYGEN SATURATION: 97 % | SYSTOLIC BLOOD PRESSURE: 129 MMHG | HEART RATE: 74 BPM | DIASTOLIC BLOOD PRESSURE: 70 MMHG | BODY MASS INDEX: 33.12 KG/M2 | WEIGHT: 211 LBS | HEIGHT: 67 IN

## 2022-09-21 PROCEDURE — G0008: CPT

## 2022-09-21 PROCEDURE — 99214 OFFICE O/P EST MOD 30 MIN: CPT | Mod: 25

## 2022-09-21 PROCEDURE — 90662 IIV NO PRSV INCREASED AG IM: CPT

## 2022-10-04 NOTE — PLAN
[FreeTextEntry1] : 73 yo woman here for f-up on chronic issues\par \par Many subspecialty appts still pending\par \par HTN- Wants to remain on HCTZ 25 and lower dose of lisinopril\par \par DVT- Remind to discuss dose again with Heme\par \par Tremor- Await neuro input  \par \par preDM- Continue to focus in weight loss, low carbs and increased physical activity\par \par Flu vaccine today\par to get 4th/2nd booster COVID vaccine at local pharm

## 2022-10-04 NOTE — HISTORY OF PRESENT ILLNESS
[de-identified] : HTN- Started Lisinopril and then stopped. Had a value in low 110s and felt off. Range sine stopping, 112-140/70-80s. Changed diet as well. Less carbs. Activity about the same. \par \par Did not do sleep study. Still fatigued. Snores. \par \par Neuro appt in Decem. Tremor the same. No progression, no other noticeable new symptoms.  \par \par DVT- Remains on xarelto. No signs of easy bruising or bleeding

## 2022-10-04 NOTE — PHYSICAL EXAM
[No Acute Distress] : no acute distress [EOMI] : extraocular movements intact [Supple] : supple [No Respiratory Distress] : no respiratory distress  [No Accessory Muscle Use] : no accessory muscle use [Clear to Auscultation] : lungs were clear to auscultation bilaterally [Normal Rate] : normal rate  [Regular Rhythm] : with a regular rhythm [Normal S1, S2] : normal S1 and S2 [Soft] : abdomen soft [Alert and Oriented x3] : oriented to person, place, and time

## 2022-10-18 ENCOUNTER — RX RENEWAL (OUTPATIENT)
Age: 72
End: 2022-10-18

## 2022-11-21 ENCOUNTER — RX RENEWAL (OUTPATIENT)
Age: 72
End: 2022-11-21

## 2022-12-12 ENCOUNTER — OUTPATIENT (OUTPATIENT)
Dept: OUTPATIENT SERVICES | Facility: HOSPITAL | Age: 72
LOS: 1 days | Discharge: HOME | End: 2022-12-12

## 2022-12-12 DIAGNOSIS — D68.9 COAGULATION DEFECT, UNSPECIFIED: ICD-10-CM

## 2022-12-12 DIAGNOSIS — Z98.891 HISTORY OF UTERINE SCAR FROM PREVIOUS SURGERY: Chronic | ICD-10-CM

## 2022-12-12 DIAGNOSIS — Z95.828 PRESENCE OF OTHER VASCULAR IMPLANTS AND GRAFTS: Chronic | ICD-10-CM

## 2022-12-15 ENCOUNTER — APPOINTMENT (OUTPATIENT)
Dept: INTERNAL MEDICINE | Facility: CLINIC | Age: 72
End: 2022-12-15

## 2022-12-15 ENCOUNTER — APPOINTMENT (OUTPATIENT)
Age: 72
End: 2022-12-15

## 2022-12-15 VITALS
TEMPERATURE: 97.2 F | HEIGHT: 66 IN | WEIGHT: 214 LBS | BODY MASS INDEX: 34.39 KG/M2 | DIASTOLIC BLOOD PRESSURE: 76 MMHG | SYSTOLIC BLOOD PRESSURE: 135 MMHG | OXYGEN SATURATION: 97 % | HEART RATE: 72 BPM

## 2022-12-15 PROCEDURE — 99214 OFFICE O/P EST MOD 30 MIN: CPT

## 2022-12-15 RX ORDER — LISINOPRIL 20 MG/1
20 TABLET ORAL DAILY
Qty: 90 | Refills: 3 | Status: DISCONTINUED | COMMUNITY
Start: 2022-07-12 | End: 2022-12-15

## 2022-12-15 RX ORDER — LISINOPRIL 10 MG/1
10 TABLET ORAL DAILY
Qty: 90 | Refills: 3 | Status: DISCONTINUED | COMMUNITY
Start: 2022-10-18 | End: 2022-12-15

## 2022-12-20 LAB
ALBUMIN SERPL ELPH-MCNC: 4.6 G/DL
ALP BLD-CCNC: 77 U/L
ALT SERPL-CCNC: 16 U/L
ANION GAP SERPL CALC-SCNC: 12 MMOL/L
AST SERPL-CCNC: 16 U/L
BASOPHILS # BLD AUTO: 0.03 K/UL
BASOPHILS NFR BLD AUTO: 0.7 %
BILIRUB DIRECT SERPL-MCNC: 0.2 MG/DL
BILIRUB INDIRECT SERPL-MCNC: 0.5 MG/DL
BILIRUB SERPL-MCNC: 0.7 MG/DL
BUN SERPL-MCNC: 15 MG/DL
CALCIUM SERPL-MCNC: 9.9 MG/DL
CHLORIDE SERPL-SCNC: 100 MMOL/L
CHOLEST SERPL-MCNC: 248 MG/DL
CO2 SERPL-SCNC: 25 MMOL/L
CREAT SERPL-MCNC: 0.92 MG/DL
EGFR: 67 ML/MIN/1.73M2
EOSINOPHIL # BLD AUTO: 0.08 K/UL
EOSINOPHIL NFR BLD AUTO: 1.8 %
ESTIMATED AVERAGE GLUCOSE: 123 MG/DL
GLUCOSE SERPL-MCNC: 96 MG/DL
HBA1C MFR BLD HPLC: 5.9 %
HCT VFR BLD CALC: 45.3 %
HDLC SERPL-MCNC: 67 MG/DL
HGB BLD-MCNC: 15.2 G/DL
IMM GRANULOCYTES NFR BLD AUTO: 0 %
IRON SATN MFR SERPL: 22 %
IRON SERPL-MCNC: 58 UG/DL
LDLC SERPL CALC-MCNC: 149 MG/DL
LYMPHOCYTES # BLD AUTO: 2.19 K/UL
LYMPHOCYTES NFR BLD AUTO: 48.8 %
MAN DIFF?: NORMAL
MCHC RBC-ENTMCNC: 31.4 PG
MCHC RBC-ENTMCNC: 33.6 GM/DL
MCV RBC AUTO: 93.6 FL
MONOCYTES # BLD AUTO: 0.27 K/UL
MONOCYTES NFR BLD AUTO: 6 %
NEUTROPHILS # BLD AUTO: 1.92 K/UL
NEUTROPHILS NFR BLD AUTO: 42.7 %
NONHDLC SERPL-MCNC: 181 MG/DL
PLATELET # BLD AUTO: 255 K/UL
POTASSIUM SERPL-SCNC: 4 MMOL/L
PROT SERPL-MCNC: 7.7 G/DL
RBC # BLD: 4.84 M/UL
RBC # FLD: 13.7 %
SODIUM SERPL-SCNC: 137 MMOL/L
TIBC SERPL-MCNC: 267 UG/DL
TRIGL SERPL-MCNC: 161 MG/DL
TSH SERPL-ACNC: 0.88 UIU/ML
UIBC SERPL-MCNC: 210 UG/DL
WBC # FLD AUTO: 4.49 K/UL

## 2022-12-27 NOTE — HISTORY OF PRESENT ILLNESS
[de-identified] : DVT/PE- On lower dose of Xarelto 10 mg. Still mild bruising, no excessive bleeding.\par Wants to keep [facility], Stop [facility]\par \par HTN- Range 130-140/50-80. Daily log. Almost no salt.\par Only HCTZ 25 mg. Edema slightly better.\par \par Back pain- Comes and goes, worse with standing. Has not completed PT\par \par Neuro appt in March 2023\par \par SH\par Part-time

## 2022-12-27 NOTE — PLAN
[FreeTextEntry1] : 73 yo woman here for follow-up on chronic issues\par \par HTN- Log at goal, no changes\par \par HLD- Hesitant in the past to start statin, readdress\par \par Radiculopathy- Encourage PMR and PT\par \par DVT- Tolerating lower dose Xarelto 10 mg\par \par Tremor- Await neuro appt

## 2022-12-27 NOTE — PHYSICAL EXAM
[No Acute Distress] : no acute distress [Clear to Auscultation] : lungs were clear to auscultation bilaterally [Normal Rate] : normal rate  [Regular Rhythm] : with a regular rhythm [Normal S1, S2] : normal S1 and S2 [Soft] : abdomen soft [Alert and Oriented x3] : oriented to person, place, and time [de-identified] : +1 symm bl edema [de-identified] : paraspinal tenderness [de-identified] : + tremor

## 2023-01-06 ENCOUNTER — OUTPATIENT (OUTPATIENT)
Dept: OUTPATIENT SERVICES | Facility: HOSPITAL | Age: 73
LOS: 1 days | End: 2023-01-06
Payer: COMMERCIAL

## 2023-01-06 ENCOUNTER — APPOINTMENT (OUTPATIENT)
Dept: SLEEP CENTER | Facility: CLINIC | Age: 73
End: 2023-01-06
Payer: COMMERCIAL

## 2023-01-06 DIAGNOSIS — Z95.828 PRESENCE OF OTHER VASCULAR IMPLANTS AND GRAFTS: Chronic | ICD-10-CM

## 2023-01-06 DIAGNOSIS — Z98.891 HISTORY OF UTERINE SCAR FROM PREVIOUS SURGERY: Chronic | ICD-10-CM

## 2023-01-06 PROCEDURE — 95800 SLP STDY UNATTENDED: CPT | Mod: 26

## 2023-01-06 PROCEDURE — 95800 SLP STDY UNATTENDED: CPT

## 2023-02-10 DIAGNOSIS — G47.33 OBSTRUCTIVE SLEEP APNEA (ADULT) (PEDIATRIC): ICD-10-CM

## 2023-04-20 ENCOUNTER — APPOINTMENT (OUTPATIENT)
Dept: INTERNAL MEDICINE | Facility: CLINIC | Age: 73
End: 2023-04-20
Payer: COMMERCIAL

## 2023-04-20 PROCEDURE — 99214 OFFICE O/P EST MOD 30 MIN: CPT

## 2023-04-26 NOTE — PLAN
[FreeTextEntry1] : 71 yo woman here for follow-up on chronic issues\par \par HTN-Close to goal, no changes\par \par Tremor- Reviewed neuro note. Encouraged to start Primidone and follow. Has appt to discuss additional imaging\par \par OA- Continue to focus on ROM exercises. Tylenol 500mg prn, avoid nsaids when possible\par \par Hypercoag- Remains on life-long anticoagulation

## 2023-04-26 NOTE — PHYSICAL EXAM
[No Acute Distress] : no acute distress [EOMI] : extraocular movements intact [Supple] : supple [No Respiratory Distress] : no respiratory distress  [No Accessory Muscle Use] : no accessory muscle use [Clear to Auscultation] : lungs were clear to auscultation bilaterally [Normal Rate] : normal rate  [Regular Rhythm] : with a regular rhythm [Normal S1, S2] : normal S1 and S2 [Alert and Oriented x3] : oriented to person, place, and time [de-identified] : +1 edema  [de-identified] : +tremor

## 2023-05-22 ENCOUNTER — RX RENEWAL (OUTPATIENT)
Age: 73
End: 2023-05-22

## 2023-09-05 NOTE — ED PROVIDER NOTE - MUSCULOSKELETAL, MLM
Take Mucinex as directed for cough.  Take prescription Paxlovid as directed    Increase oral fluids, take daily vitamins as directed, Self quarantine for 5 days.    Go to ER for worsening symptoms including shortness of breath, fever, or worsening symptoms.     POSITIVE COVID TEST    You have tested positive for COVID-19 today.  Please note that patients who test positive for COVID-19 are required by the CDC to undergo isolation for 5 days     However, if you are asymptomatic (a person who does not have any symptoms) and COVID-19 positive, your 5-day isolation begins on the day you tested positive, regardless of exposure date.    Also, per the CDC guidelines, once your 5 days have passed, and you have not had fever greater than 100.4F in the last 24 hours without taking any fever reducers such as Tylenol (Acetaminophen) or Motrin (Ibuprofen), you may return to your normal activities including social distancing, wearing masks, and frequent handwashing - YOU DO NOT NEED ANOTHER TEST IN ORDER TO END YOUR QUARANTINE.      Lower sacral coccyx tenderness Lower sacral coccyx tenderness, no lumbar spine tenderness, no step offs

## 2023-11-16 ENCOUNTER — APPOINTMENT (OUTPATIENT)
Dept: INTERNAL MEDICINE | Facility: CLINIC | Age: 73
End: 2023-11-16
Payer: COMMERCIAL

## 2023-11-16 VITALS
HEART RATE: 54 BPM | BODY MASS INDEX: 32.95 KG/M2 | OXYGEN SATURATION: 97 % | SYSTOLIC BLOOD PRESSURE: 136 MMHG | RESPIRATION RATE: 15 BRPM | DIASTOLIC BLOOD PRESSURE: 79 MMHG | WEIGHT: 205 LBS | HEIGHT: 66 IN

## 2023-11-16 DIAGNOSIS — R76.8 OTHER SPECIFIED ABNORMAL IMMUNOLOGICAL FINDINGS IN SERUM: ICD-10-CM

## 2023-11-16 PROCEDURE — 99214 OFFICE O/P EST MOD 30 MIN: CPT | Mod: 25

## 2023-11-16 PROCEDURE — 90662 IIV NO PRSV INCREASED AG IM: CPT

## 2023-11-16 PROCEDURE — G0008: CPT

## 2023-11-17 ENCOUNTER — OUTPATIENT (OUTPATIENT)
Dept: OUTPATIENT SERVICES | Facility: HOSPITAL | Age: 73
LOS: 1 days | Discharge: HOME | End: 2023-11-17

## 2023-11-17 DIAGNOSIS — Z95.828 PRESENCE OF OTHER VASCULAR IMPLANTS AND GRAFTS: Chronic | ICD-10-CM

## 2023-11-17 DIAGNOSIS — Z98.891 HISTORY OF UTERINE SCAR FROM PREVIOUS SURGERY: Chronic | ICD-10-CM

## 2023-11-17 DIAGNOSIS — D68.9 COAGULATION DEFECT, UNSPECIFIED: ICD-10-CM

## 2023-11-17 LAB
ALBUMIN SERPL ELPH-MCNC: 4.7 G/DL
ALP BLD-CCNC: 93 U/L
ALT SERPL-CCNC: 22 U/L
ANION GAP SERPL CALC-SCNC: 16 MMOL/L
AST SERPL-CCNC: 20 U/L
BILIRUB DIRECT SERPL-MCNC: 0.2 MG/DL
BILIRUB INDIRECT SERPL-MCNC: 0.8 MG/DL
BILIRUB SERPL-MCNC: 1 MG/DL
BUN SERPL-MCNC: 18 MG/DL
CALCIUM SERPL-MCNC: 10 MG/DL
CHLORIDE SERPL-SCNC: 102 MMOL/L
CO2 SERPL-SCNC: 22 MMOL/L
CREAT SERPL-MCNC: 0.92 MG/DL
EGFR: 66 ML/MIN/1.73M2
ESTIMATED AVERAGE GLUCOSE: 128 MG/DL
GLUCOSE SERPL-MCNC: 99 MG/DL
HBA1C MFR BLD HPLC: 6.1 %
HCT VFR BLD CALC: 47.3 %
HGB BLD-MCNC: 15.7 G/DL
MCHC RBC-ENTMCNC: 29.8 PG
MCHC RBC-ENTMCNC: 33.2 GM/DL
MCV RBC AUTO: 89.8 FL
PLATELET # BLD AUTO: 270 K/UL
POTASSIUM SERPL-SCNC: 3.7 MMOL/L
PROT SERPL-MCNC: 8 G/DL
RBC # BLD: 5.27 M/UL
RBC # FLD: 13.2 %
SODIUM SERPL-SCNC: 140 MMOL/L
TSH SERPL-ACNC: 0.63 UIU/ML
WBC # FLD AUTO: 4.58 K/UL

## 2023-11-17 RX ORDER — PRIMIDONE 50 MG/1
50 TABLET ORAL
Refills: 0 | Status: ACTIVE | COMMUNITY
Start: 2023-11-17

## 2023-11-24 ENCOUNTER — APPOINTMENT (OUTPATIENT)
Dept: HEMATOLOGY ONCOLOGY | Facility: CLINIC | Age: 73
End: 2023-11-24
Payer: COMMERCIAL

## 2023-11-24 VITALS
HEART RATE: 59 BPM | RESPIRATION RATE: 16 BRPM | TEMPERATURE: 97.3 F | SYSTOLIC BLOOD PRESSURE: 118 MMHG | OXYGEN SATURATION: 97 % | DIASTOLIC BLOOD PRESSURE: 63 MMHG | BODY MASS INDEX: 33.09 KG/M2 | WEIGHT: 205.03 LBS

## 2023-11-24 DIAGNOSIS — I82.409 ACUTE EMBOLISM AND THROMBOSIS OF UNSPECIFIED DEEP VEINS OF UNSPECIFIED LOWER EXTREMITY: ICD-10-CM

## 2023-11-24 PROCEDURE — 99214 OFFICE O/P EST MOD 30 MIN: CPT

## 2023-11-26 PROBLEM — I82.409 RECURRENT DEEP VEIN THROMBOSIS (DVT): Status: ACTIVE | Noted: 2021-05-16

## 2023-11-28 RX ORDER — RIVAROXABAN 10 MG/1
10 TABLET, FILM COATED ORAL
Qty: 1 | Refills: 3 | Status: ACTIVE | COMMUNITY
Start: 2022-08-29 | End: 1900-01-01

## 2023-12-27 ENCOUNTER — RX RENEWAL (OUTPATIENT)
Age: 73
End: 2023-12-27

## 2023-12-27 RX ORDER — ATORVASTATIN CALCIUM 40 MG/1
40 TABLET, FILM COATED ORAL
Qty: 90 | Refills: 3 | Status: ACTIVE | COMMUNITY
Start: 2022-12-20 | End: 1900-01-01

## 2024-01-05 NOTE — ED ADULT TRIAGE NOTE - HEIGHT IN FEET
-- DO NOT REPLY / DO NOT REPLY ALL --  -- Message is from Engagement Center [employer] ([facility]) --    General Patient Message: Patient calling as she needs a blood TB test ordered ASAP and a work physical. Please assist.     Caller Information         Type Contact Phone/Fax    01/05/2024 03:35 PM CST Phone (Incoming) Amparo Cisneros (Self) [phone] (M)          Alternative phone number: no    Can a detailed message be left? Yes    Message Turnaround:     Is it Working Hours? Yes - Working Hours                      5

## 2024-05-23 ENCOUNTER — RX RENEWAL (OUTPATIENT)
Age: 74
End: 2024-05-23

## 2024-05-23 RX ORDER — HYDROCHLOROTHIAZIDE 25 MG/1
25 TABLET ORAL DAILY
Qty: 90 | Refills: 3 | Status: ACTIVE | COMMUNITY
Start: 2021-06-01 | End: 1900-01-01

## 2024-05-30 ENCOUNTER — APPOINTMENT (OUTPATIENT)
Dept: INTERNAL MEDICINE | Facility: CLINIC | Age: 74
End: 2024-05-30
Payer: MEDICARE

## 2024-05-30 VITALS
BODY MASS INDEX: 33.43 KG/M2 | OXYGEN SATURATION: 97 % | DIASTOLIC BLOOD PRESSURE: 79 MMHG | HEART RATE: 65 BPM | HEIGHT: 66 IN | WEIGHT: 208 LBS | SYSTOLIC BLOOD PRESSURE: 145 MMHG | TEMPERATURE: 98.1 F

## 2024-05-30 DIAGNOSIS — D58.2 OTHER HEMOGLOBINOPATHIES: ICD-10-CM

## 2024-05-30 DIAGNOSIS — E78.00 PURE HYPERCHOLESTEROLEMIA, UNSPECIFIED: ICD-10-CM

## 2024-05-30 DIAGNOSIS — M17.9 OSTEOARTHRITIS OF KNEE, UNSPECIFIED: ICD-10-CM

## 2024-05-30 DIAGNOSIS — I10 ESSENTIAL (PRIMARY) HYPERTENSION: ICD-10-CM

## 2024-05-30 PROCEDURE — G2211 COMPLEX E/M VISIT ADD ON: CPT

## 2024-05-30 PROCEDURE — 99214 OFFICE O/P EST MOD 30 MIN: CPT

## 2024-05-31 LAB
ALBUMIN SERPL ELPH-MCNC: 4.1 G/DL
ALP BLD-CCNC: 86 U/L
ALT SERPL-CCNC: 40 U/L
ANION GAP SERPL CALC-SCNC: 13 MMOL/L
AST SERPL-CCNC: 31 U/L
BILIRUB DIRECT SERPL-MCNC: 0.3 MG/DL
BILIRUB INDIRECT SERPL-MCNC: 1 MG/DL
BILIRUB SERPL-MCNC: 1.3 MG/DL
BUN SERPL-MCNC: 19 MG/DL
CALCIUM SERPL-MCNC: 9.8 MG/DL
CHLORIDE SERPL-SCNC: 101 MMOL/L
CHOLEST SERPL-MCNC: 155 MG/DL
CO2 SERPL-SCNC: 24 MMOL/L
CREAT SERPL-MCNC: 0.97 MG/DL
EGFR: 62 ML/MIN/1.73M2
ESTIMATED AVERAGE GLUCOSE: 131 MG/DL
GLUCOSE SERPL-MCNC: 112 MG/DL
HBA1C MFR BLD HPLC: 6.2 %
HCT VFR BLD CALC: 42.6 %
HDLC SERPL-MCNC: 69 MG/DL
HGB BLD-MCNC: 14.6 G/DL
LDLC SERPL CALC-MCNC: 73 MG/DL
MCHC RBC-ENTMCNC: 29.9 PG
MCHC RBC-ENTMCNC: 34.3 GM/DL
MCV RBC AUTO: 87.1 FL
NONHDLC SERPL-MCNC: 86 MG/DL
PLATELET # BLD AUTO: 263 K/UL
POTASSIUM SERPL-SCNC: 4 MMOL/L
PROT SERPL-MCNC: 7 G/DL
RBC # BLD: 4.89 M/UL
RBC # FLD: 13.3 %
SODIUM SERPL-SCNC: 138 MMOL/L
TRIGL SERPL-MCNC: 66 MG/DL
TSH SERPL-ACNC: 1.03 UIU/ML
WBC # FLD AUTO: 3.88 K/UL

## 2024-06-04 NOTE — PHYSICAL EXAM
[No Acute Distress] : no acute distress [EOMI] : extraocular movements intact [Supple] : supple [No Respiratory Distress] : no respiratory distress  [No Accessory Muscle Use] : no accessory muscle use [Clear to Auscultation] : lungs were clear to auscultation bilaterally [Normal Rate] : normal rate  [Regular Rhythm] : with a regular rhythm [Normal S1, S2] : normal S1 and S2 [Soft] : abdomen soft [No CVA Tenderness] : no CVA  tenderness [Alert and Oriented x3] : oriented to person, place, and time [de-identified] : +2 edema, less pitting, wearing compression stockings

## 2024-06-04 NOTE — PLAN
[FreeTextEntry1] : HTN- Close to goal, no changes. Lytes wnl  PE/DVT- No new issues, tolerating xarelto has Heme f-up  Tremor- Will discuss higher dose with neuro at appt  preDM- Continue to focus on diet and exercise, follow A1C  HLD- LDL 70s, remain on lipitor 40 mg

## 2024-06-04 NOTE — HISTORY OF PRESENT ILLNESS
[FreeTextEntry1] : Follow-up on chronic issues [de-identified] : HTN- Daily log, range 120-130/63-75. No change sin leg edema. More gardening, has not returned to walking  Heme- Xarelto- tolerating, no issues, no bruising or bleeding  preDM- Focused on low carbs BK- Fruit LN- cheese DR- chicken and  veggies walking 3 times a week, gardening  Tremor- appt tomorrow. On Primidone 50 mg once a day. Since starting has noticed a large improvement in hand tremor  SH Semi-retired. Flexible time Vacationing more sister in rehab, recent bleed

## 2024-06-22 NOTE — ED PROVIDER NOTE -                                                                                                                                ATTENDING ATTESTATION SECTION
[facility]   Physical Therapy Treatment Note    Name: Robles Jordan  Clinic Number: [id number]    Therapy Diagnosis:   Encounter Diagnoses   Name Primary?    Difficulty walking Yes    Impaired functional mobility, balance, gait, and endurance      Physician: Ervin [place]     Visit Date: 3/28/2022    Physician Orders: PT Eval and Treat Neuro  Medical Diagnosis from Referral: Sensory neuropathy [G62.9]  Evaluation Date: 12/9/2021  Authorization Period Expiration: 12/31/21  Plan of Care Expiration: 3/3/22  NEW POC: 3/9/22 to 5/18/22  Visit # / Visits authorized: 1/ 1, 17/20   PN: 3/18/22 - postponed to next session    PTA Visit #: 0/5    Time In: 10:16  Time Out: 11:00  Total Billable Time: 44 minutes  Total Time: 44 minutes     Precautions: Standard, Diabetes, Fall and hx of tethered cord    SUBJECTIVE     Pt reports: feeling okay, fatigued, only able to ride stationary home bike 1 time; back feels tight and there is some relief when it pops with motion     He was compliant with home exercise program.  Response to previous treatment: fine   Functional change: ongoing      Pain: 0/10  Location: right leg    Fatigue: 4-5/10    OBJECTIVE     Objective Measures updated at progress report unless specified.      Treatment   Bolded performed today:   Italics not performed today:     Robles received the treatments listed below:      therapeutic exercises to develop strength, endurance and ROM for 44 minutes including:     NuStep L3 x 8 minutes   Rec bike L1 x 6 minutes - req multiple breaks to re-insert R foot into pedal strap    Seated at Mat:    Sit to stand x10 x5    Knee extensions 2# LLE, 2# RLE 3x10   Knee flexion GTB 3x10 B    Seated knee flexion heel slides 2# and GTB LLE, RTB RLE 3x10   Hip flexion 2#LLE, 0# RLE 1x10    Hip ADD GTB 3x12 B    Hip ABD GTB 3x10 B    Marches 3x8 B -min A from PT on R   Ankle pumps 3x10 B - R LE slid je from mat   Heel lifts 3x8 B -min A from PT on R  Podiatry Consult Note        Patient Name:  Sury Sanon     YOB: 1951     Date of service:  6/22/2024    Reason for consult: Right foot cellulitis    History of Present Illness:  Patient is a 73-year-old female with the below mentioned past medical history admitted to the hospital with right foot cellulitis.  Briefly, she underwent right foot bunionectomy and hallux osteotomy on 5/15/2024 without complication.  She has been struggling to heal the surgical incisions ever since.  She is a diabetic and has a prior remote history of smoking.  She has been on a course of oral Bactrim and relates that she still has some redness in her foot.  She has no pain, no current or recent nausea, vomiting, fever, chills, chest pain or shortness of breath.  No significant drainage.    Past Medical History  Past Medical History:   Diagnosis Date    Allergy ?    Anxiety     Blood transfusion without reported diagnosis 2004    Bilateral knee replacements    Cervical arthritis     COPD (chronic obstructive pulmonary disease)  (CMD)     Several times 1980’s - 90’s    Depression 02/25/2015    Deviated nasal septum     Fibromyalgia     Gastroesophageal reflux disease     GERD (gastroesophageal reflux disease) 07/22/2022    Hiatal hernia     Hypertension     Hypothyroidism     Sleep apnea     Was diagnosed with CPAP, but couldn't tolerate CPAP; they did a study and said it was okay to be on oxygen at night only    Status post THR (total hip replacement) 03/20/2013    Stomach pain     Stroke (cerebrum)  (CMD)     13 years ago, been on clopidogrel since    Type 2 diabetes mellitus  (CMD)         Surgical History  Past Surgical History:   Procedure Laterality Date    Colonoscopy  2005    Fracture surgery  2019    Shoulder    Joint replacement  2004, 2013    Knees, left hip    Patent foramen ovale closure      Shoulder arthroscopy w/ rotator cuff repair Left     Thyroidectomy      Tonsillectomy      Total hip replacement Left      Total knee replacement Bilateral     Tubal ligation          Social History  Social History     Tobacco Use    Smoking status: Former     Current packs/day: 0.00     Average packs/day: 1.1 packs/day for 46.3 years (53.0 ttl pk-yrs)     Types: Cigarettes     Start date: 1970     Quit date:      Years since quittin.4     Passive exposure: Never    Smokeless tobacco: Never   Vaping Use    Vaping status: never used   Substance Use Topics    Alcohol use: Not Currently     Comment: holidays     Drug use: Never       Family History    Family History   Problem Relation Age of Onset    Myocardial Infarction Mother     Heart disease Mother          from    Hypertension Mother     Heart disease Father             Osteoarthritis Brother     Heart disease Maternal Aunt             Heart disease Maternal Uncle             Dementia/Alzheimers Paternal Uncle     Stroke Maternal Grandmother          from        Allergies  ALLERGIES:  Lisinopril, Metformin, Pioglitazone, and Rosuvastatin    Medications  Medications Prior to Admission   Medication Sig Dispense Refill    topiramate (TOPAMAX) 25 MG tablet Take 25 mg by mouth in the morning and 25 mg in the evening.      hydroCHLOROthiazide 12.5 MG tablet Take 1 tablet by mouth daily. 90 tablet 0    levothyroxine (Euthyrox) 125 MCG tablet Take 1 tablet by mouth daily. 90 tablet 0    pravastatin (PRAVACHOL) 40 MG tablet Take 1 tablet by mouth at bedtime. 90 tablet 3    metoPROLOL tartrate (LOPRESSOR) 25 MG tablet Take 1 tablet by mouth in the morning and 1 tablet in the evening. 60 tablet 1    Krill Oil Omega-3 500 MG Cap       Misc Natural Products (Turmeric Curcumin) Cap       Multiple Vitamins-Minerals (vitamin - therapeutic multivitamins w/minerals) tablet       glimepiride (AMARYL) 1 MG tablet Take 0.5 tablets by mouth daily (before breakfast). 45 tablet 1    irbesartan (AVAPRO) 300 MG tablet Take 1 tablet by mouth at bedtime. 90 tablet 1     "LE   Pelvic rotations on bosu ball 2x10 rotations     2x10 AP    2x10 lateral     At // bars:    Standing Hip ABD 2x10   Standing hip ext 2x10   Standing knee flexion 5x4 - 2# at LLE, 0# RLE    Forward stepping, CGA, 2x10 ea LE - one UE assist on bars    Standing marches 2x10 B     Supine at Mat:    Bridge 10x1    10x2 green ball at B knees     10x2 light green ball under calves    Dead bug position     Isometric holds with light green ball     10x 5s holds    Quadruped    2x5 leg extensions     2x10 arm extensions         LTR 2 min   Clamshells 10x3   Heel slides 10x2     Manual therapy to control pain and increase ROM for 00 minutes including:     Manual distraction, R hip, holds    +active hip hike contractions during distraction    Not performed:  Seated on BOSU ball - trunk balance x 2 minutes    Patient Education and Home Exercises     Home Exercises Provided and Patient Education Provided     Education provided:   -Education on positive affects of consistent exercise.  -ALTER-G use and positioning     Written Home Exercises Provided: Patient instructed to cont prior HEP. Exercises were reviewed and Robles was able to demonstrate them prior to the end of the session.  Arboleda demonstrated good  understanding of the education provided. See EMR under Patient Instructions for exercises provided during therapy sessions    ASSESSMENT   Arboleda with fatigue and low back discomfort this session with indications of possible need for core and low back flexibility this session. Progress note to be assessed at next session. Pt challenged in dead bug and quadruped positions with core strengthening and low back flexibility targeted. Pt feels like things "loosened up" during session however feels he may be sore tomorrow. Pt continues to be appropriate for skilled PT at this time.       Robles Is progressing well towards his goals.   Pt prognosis is Fair.     Pt will continue to benefit from skilled outpatient physical " COLLAGEN PO Take by mouth daily.      MAGNESIUM OXIDE PO       TART CHERRY PO       Acetaminophen (TYLENOL ARTHRITIS PAIN PO)       clopidogrel (PLAVIX) 75 MG tablet Take 1 tablet by mouth once daily 90 tablet 0    DULoxetine (CYMBALTA) 30 MG capsule Take 1 capsule by mouth twice daily (Patient taking differently: Take 30 mg by mouth daily.) 180 capsule 0    Ascorbic Acid (VITAMIN C PO)       cholecalciferol (VITAMIN D) 25 mcg (1,000 units) tablet Take 25 mcg by mouth daily.      Coenzyme Q10 (Co Q-10) 400 MG Cap Take 1 capsule by mouth.          Physical Exam    Last Recorded Vitals  Blood pressure 94/58, pulse 65, temperature 97.9 °F (36.6 °C), temperature source Temporal, resp. rate 18, height 5' 5\" (1.651 m), weight 88.8 kg (195 lb 12.3 oz), SpO2 95%.  Temp (24hrs), Av.8 °F (37.1 °C), Min:97.9 °F (36.6 °C), Max:99.3 °F (37.4 °C)  Temp (72hrs), Av.8 °F (37.1 °C), Min:97.9 °F (36.6 °C), Max:99.3 °F (37.4 °C)    Systolic (24hrs), Av , Min:90 , Max:111   Diastolic (24hrs), Av, Min:47, Max:61        Gen: AOx3, NAD  Lower extremity focused exam:  Pulses are faintly palpable, skin temperature is warm, no local increase in temperature.  There is brisk capillary fill time distally at the toes.  There is evidence of 2 incisions along the medial column of the right foot with significant dry scabbing noted.  No induration or crepitus, no purulence, no proximal streaking.  There is mild erythema and some punctate red spots diffusely over the foot.  First metatarsal phalangeal joint is mildly swollen.  No pain with palpation or range of motion.  No pain with compression of the calf.    Lab Results   Component Value Date/Time    WBC 5.6 2024 05:29 AM    WBC 5.4 2024 12:45 PM    WBC 7.5 2024 01:43 PM    WBC 7.2 2023 11:13 AM    WBC 8.3 10/08/2020 03:46 PM    WBC 7.5 2019 11:46 AM    WBC 7.5 2019 11:24 AM    WBC 8.4 2018 10:19 AM    HGB 12.3 2024 05:29 AM    HGB  therapy to address the deficits listed in the problem list box on initial evaluation, provide pt/family education and to maximize pt's level of independence in the home and community environment.     Pt's spiritual, cultural and educational needs considered and pt agreeable to plan of care and goals.     Anticipated barriers to physical therapy: medical hx     Goals: as of 2/18/22  Short Term Goals: 6 weeks   1. Patient demonstrates independence with HEP. - MET 2/18/22  2. Patient will transfer sit to/from standing with RW, CGA, no use of UE and with good body mechanics to promote safe mobility within the home. - ongoing  3. Patient will improve balance within condition 2 of the MCTSIB to 30s to reduce risk of falling. - MET 2/18/22  4. Patient will improve 5xSTS score by 3s to promote LE functional strength. - MET 2/18/22  5. Patient will improve TUG score by 6s to promote functional mobility. - MET 2/18/22   Patient will improve BLE strength to 3+/5 to promote improved mobility against gravity. - ongoing      Long Term Goals: 12 weeks   1. Patient will be independent with home exercise program (HEP) to promote continued rehabilitation following discharge.  2. Patient will improve 5xSTS score by 9s to promote LE functional strength progression. - ongoing   3. Patient will improve TUG score by 12s to promote functional mobility. - ongoing  4. Patient will improve gait speed to 0.4 m/s progressing toward safe household ambulation. - ongoing  5. Assess endurance and create appropriate goals to track progression and improvements in safe community ambulation.    PLAN   Plan of care Certification: 12/9/2021 to 3/3/21. UPDATED POC: 3/9/22 to 5/18/22.      Outpatient Physical Therapy 2 times weekly for 12 weeks to include the following interventions: Gait Training, Manual Therapy, Moist Heat/ Ice, Neuromuscular Re-ed, Patient Education, Therapeutic Activites and Therapeutic Exercise.     ALTER-G trial in upcoming sessions  14.0 06/21/2024 12:45 PM    HGB 15.1 04/22/2024 01:43 PM    HGB 15.4 07/20/2023 11:13 AM    HGB 16.7 (H) 10/08/2020 03:46 PM    HGB 14.8 08/20/2019 11:46 AM    HGB 14.5 03/12/2019 11:24 AM    HGB 15.0 12/19/2018 10:19 AM    HCT 36.9 06/22/2024 05:29 AM    HCT 49.1 (H) 10/08/2020 03:46 PM     06/22/2024 05:29 AM     10/08/2020 03:46 PM     No components found for: \"HA1C\"  Lab Results   Component Value Date/Time    VITD25 45.6 07/30/2020 08:01 AM    URIC 6.2 (H) 06/25/2022 09:35 AM    URIC 5.9 12/19/2018 10:19 AM       Lab Results   Component Value Date/Time    CO2 22 06/22/2024 05:29 AM    CO2 28 10/08/2020 03:46 PM    BUN 13 06/22/2024 05:29 AM    BUN 14 10/08/2020 03:46 PM    MG 1.9 06/21/2024 12:45 PM    MG 2.5 (H) 10/08/2020 03:46 PM     Lab Results   Component Value Date/Time    PT 10.8 06/21/2024 12:45 PM    PT 10.0 10/08/2020 03:46 PM    INR 1.0 06/21/2024 12:45 PM    INR 1.0 10/08/2020 03:46 PM    PTT 34 (H) 06/21/2024 12:45 PM    PTT 31 10/08/2020 03:46 PM                                  Lab Results   Component Value Date/Time    CULT NO GROWTH 5 DAYS. 10/08/2020 05:40 PM          Imaging:  XR FOOT 3 OR MORE VIEWS RIGHT    Result Date: 6/21/2024  EXAM: XR FOOT 3 OR MORE VIEWS RIGHT CLINICAL INDICATION: Foot pain, rule out osteomyelitis. COMPARISON: None.     Postsurgical changes compatible with osteotomy of the distal first metatarsal with 2 transversing orthopedic screws. Additional surgical clip in the proximal first phalanx. Small plantar calcaneal spur. No fracture, subluxation, periosteal reaction, or focal bone destruction. Soft tissues are unremarkable. MRI foot could be obtained for further evaluation if high clinical suspicion of osteomyelitis. Electronically Signed by: JHON IRBY M.D. Signed on: 6/21/2024 3:51 PM Workstation ID: [id number]    XR CHEST PA OR AP 1 VIEW    Result Date: 6/21/2024  EXAM:  XR CHEST AP OR PA  CLINICAL INDICATION: Pain, fatigue COMPARISON:  for unweighting of BLE and antigravity mobility. PROGRESS NOTE NEXT SESSION.     Kaye Anderson, PT, DPT  3/28/2022   10/8/2020     FINDINGS/IMPRESSION: Normal heart size. No mediastinal widening. No lung consolidation. No effusions or cephalization of vessels. No pneumothorax. Electronically Signed by: BERNICE JAMISON M.D. Signed on: 6/21/2024 3:13 PM Workstation ID: [id number]       Assessment/plan:  Status post right foot bunionectomy/hallux osteotomy on 5/15/2024 with delayed wound healing and cellulitis in the setting of type 2 diabetes mellitus and prior tobacco use    Thankfully the patient has remained afebrile with no leukocytosis.  She did have x-rays which did not show any obvious or acute signs of osteomyelitis although an early bone infection would be difficult to exclude with plain film x-rays.  Clinically her foot is looking better in terms of the erythema.  She does almost have a rash like area over the dorsum of her foot and chest that may be a medication reaction.  Her incisions are not closed but they are scabbed over and there is no drainage.  As there is not any significant drainage I do not think that cultures of the scab would be useful as it is likely to grow skin сергей/contaminants.  As the patient is a diabetic and has failed an outpatient course of Bactrim I would recommend infectious disease consult for antibiotic management.  Right now I think she would do best leaving the incision open to air to continue to scab, I think is going to take quite some time for this to completely heal.  At this point I do not think it would be beneficial to perform an incision and drainage or placing sutures as the wound likely needs to continue to heal secondarily.  We could consider wound care center evaluation as an outpatient.  Unfortunately the postop shoe seems to be irritating along the incision but there really is no other option for offloading the foot.  We could consider a fracture boot but this is significantly larger and is likely to still press along the area.  I would recommend that she does use a  light gauze dressing whenever ambulating using the postoperative shoe.  Please contact with any questions I will continue to follow closely while here in the hospital.    Keo Beasley DPM  [facility]           Statement Selected

## 2024-11-14 ENCOUNTER — APPOINTMENT (OUTPATIENT)
Dept: INTERNAL MEDICINE | Facility: CLINIC | Age: 74
End: 2024-11-14
Payer: MEDICARE

## 2024-11-14 VITALS
SYSTOLIC BLOOD PRESSURE: 154 MMHG | OXYGEN SATURATION: 97 % | WEIGHT: 204 LBS | HEART RATE: 60 BPM | DIASTOLIC BLOOD PRESSURE: 73 MMHG | BODY MASS INDEX: 32.78 KG/M2 | TEMPERATURE: 97.8 F | HEIGHT: 66 IN

## 2024-11-14 DIAGNOSIS — M17.9 OSTEOARTHRITIS OF KNEE, UNSPECIFIED: ICD-10-CM

## 2024-11-14 DIAGNOSIS — E78.00 PURE HYPERCHOLESTEROLEMIA, UNSPECIFIED: ICD-10-CM

## 2024-11-14 DIAGNOSIS — Z13.820 ENCOUNTER FOR SCREENING FOR OSTEOPOROSIS: ICD-10-CM

## 2024-11-14 DIAGNOSIS — R76.8 OTHER SPECIFIED ABNORMAL IMMUNOLOGICAL FINDINGS IN SERUM: ICD-10-CM

## 2024-11-14 PROCEDURE — 99214 OFFICE O/P EST MOD 30 MIN: CPT

## 2024-11-14 PROCEDURE — G0008: CPT

## 2024-11-14 PROCEDURE — 36415 COLL VENOUS BLD VENIPUNCTURE: CPT

## 2024-11-14 PROCEDURE — 90662 IIV NO PRSV INCREASED AG IM: CPT

## 2024-11-15 LAB
25(OH)D3 SERPL-MCNC: 33.5 NG/ML
ALBUMIN SERPL ELPH-MCNC: 4.3 G/DL
ALP BLD-CCNC: 100 U/L
ALT SERPL-CCNC: 30 U/L
ANION GAP SERPL CALC-SCNC: 14 MMOL/L
AST SERPL-CCNC: 27 U/L
BILIRUB DIRECT SERPL-MCNC: 0.2 MG/DL
BILIRUB INDIRECT SERPL-MCNC: 0.8 MG/DL
BILIRUB SERPL-MCNC: 1.1 MG/DL
BUN SERPL-MCNC: 16 MG/DL
CALCIUM SERPL-MCNC: 10.1 MG/DL
CHLORIDE SERPL-SCNC: 101 MMOL/L
CHOLEST SERPL-MCNC: 170 MG/DL
CO2 SERPL-SCNC: 23 MMOL/L
CREAT SERPL-MCNC: 0.9 MG/DL
EGFR: 68 ML/MIN/1.73M2
ESTIMATED AVERAGE GLUCOSE: 123 MG/DL
GLUCOSE SERPL-MCNC: 94 MG/DL
HBA1C MFR BLD HPLC: 5.9 %
HCT VFR BLD CALC: 44.2 %
HDLC SERPL-MCNC: 76 MG/DL
HGB BLD-MCNC: 14.9 G/DL
LDLC SERPL CALC-MCNC: 77 MG/DL
MCHC RBC-ENTMCNC: 30.3 PG
MCHC RBC-ENTMCNC: 33.7 G/DL
MCV RBC AUTO: 89.8 FL
NONHDLC SERPL-MCNC: 94 MG/DL
PLATELET # BLD AUTO: 253 K/UL
POTASSIUM SERPL-SCNC: 4.2 MMOL/L
PROT SERPL-MCNC: 7.4 G/DL
RBC # BLD: 4.92 M/UL
RBC # FLD: 13.3 %
SODIUM SERPL-SCNC: 138 MMOL/L
TRIGL SERPL-MCNC: 94 MG/DL
TSH SERPL-ACNC: 0.68 UIU/ML
WBC # FLD AUTO: 4.12 K/UL

## 2025-03-13 ENCOUNTER — LABORATORY RESULT (OUTPATIENT)
Age: 75
End: 2025-03-13

## 2025-03-13 ENCOUNTER — APPOINTMENT (OUTPATIENT)
Dept: INTERNAL MEDICINE | Facility: CLINIC | Age: 75
End: 2025-03-13

## 2025-03-13 VITALS
OXYGEN SATURATION: 98 % | SYSTOLIC BLOOD PRESSURE: 144 MMHG | DIASTOLIC BLOOD PRESSURE: 78 MMHG | TEMPERATURE: 97.6 F | HEIGHT: 65 IN | HEART RATE: 76 BPM | BODY MASS INDEX: 35.82 KG/M2 | WEIGHT: 215 LBS

## 2025-03-13 DIAGNOSIS — I10 ESSENTIAL (PRIMARY) HYPERTENSION: ICD-10-CM

## 2025-03-13 DIAGNOSIS — D58.2 OTHER HEMOGLOBINOPATHIES: ICD-10-CM

## 2025-03-13 DIAGNOSIS — Z12.39 ENCOUNTER FOR OTHER SCREENING FOR MALIGNANT NEOPLASM OF BREAST: ICD-10-CM

## 2025-03-13 PROCEDURE — G2211 COMPLEX E/M VISIT ADD ON: CPT

## 2025-03-13 PROCEDURE — 36415 COLL VENOUS BLD VENIPUNCTURE: CPT

## 2025-03-13 PROCEDURE — 99214 OFFICE O/P EST MOD 30 MIN: CPT

## 2025-05-09 ENCOUNTER — NON-APPOINTMENT (OUTPATIENT)
Age: 75
End: 2025-05-09

## 2025-05-23 ENCOUNTER — APPOINTMENT (OUTPATIENT)
Dept: MAMMOGRAPHY | Facility: IMAGING CENTER | Age: 75
End: 2025-05-23
Payer: MEDICARE

## 2025-05-23 ENCOUNTER — OUTPATIENT (OUTPATIENT)
Dept: OUTPATIENT SERVICES | Facility: HOSPITAL | Age: 75
LOS: 1 days | End: 2025-05-23

## 2025-05-23 ENCOUNTER — OUTPATIENT (OUTPATIENT)
Dept: OUTPATIENT SERVICES | Facility: HOSPITAL | Age: 75
LOS: 1 days | End: 2025-05-23
Payer: MEDICARE

## 2025-05-23 ENCOUNTER — RESULT REVIEW (OUTPATIENT)
Age: 75
End: 2025-05-23

## 2025-05-23 DIAGNOSIS — Z98.891 HISTORY OF UTERINE SCAR FROM PREVIOUS SURGERY: Chronic | ICD-10-CM

## 2025-05-23 DIAGNOSIS — Z00.8 ENCOUNTER FOR OTHER GENERAL EXAMINATION: ICD-10-CM

## 2025-05-23 DIAGNOSIS — Z12.39 ENCOUNTER FOR OTHER SCREENING FOR MALIGNANT NEOPLASM OF BREAST: ICD-10-CM

## 2025-05-23 DIAGNOSIS — Z95.828 PRESENCE OF OTHER VASCULAR IMPLANTS AND GRAFTS: Chronic | ICD-10-CM

## 2025-05-23 PROCEDURE — 77067 SCR MAMMO BI INCL CAD: CPT

## 2025-05-23 PROCEDURE — 77063 BREAST TOMOSYNTHESIS BI: CPT

## 2025-05-23 PROCEDURE — 77067 SCR MAMMO BI INCL CAD: CPT | Mod: 26

## 2025-05-23 PROCEDURE — 77063 BREAST TOMOSYNTHESIS BI: CPT | Mod: 26

## 2025-06-18 NOTE — PATIENT PROFILE ADULT - NSPROPTRIGHTCAREGIVER_GEN_A_NUR
[facility] Chart Review & Patient Outreach Details For Medication Adherence Project    Reason for Outreach Encounter: 3rd Party payor non-compliance report ([employer], [employer], C, etc)  2.  Patient Outreach Method: [employer] message  3.   Medication in question:   Hypertension Medications              lisinopriL (PRINIVIL,ZESTRIL) 20 MG tablet Take 1 tablet (20 mg total) by mouth once daily.              4.  Reviewed and or Updates Made To: Patient Chart  5. Outreach Outcomes and/or actions taken: Sent inquiry to patient: Waiting for response.   
taking care of myself 
declines

## 2025-07-02 ENCOUNTER — RX RENEWAL (OUTPATIENT)
Age: 75
End: 2025-07-02

## 2025-07-02 ENCOUNTER — APPOINTMENT (OUTPATIENT)
Dept: INTERNAL MEDICINE | Facility: CLINIC | Age: 75
End: 2025-07-02

## 2025-07-02 VITALS
HEIGHT: 65 IN | DIASTOLIC BLOOD PRESSURE: 79 MMHG | TEMPERATURE: 97.6 F | OXYGEN SATURATION: 97 % | WEIGHT: 215 LBS | HEART RATE: 59 BPM | SYSTOLIC BLOOD PRESSURE: 134 MMHG | BODY MASS INDEX: 35.82 KG/M2

## 2025-07-02 PROBLEM — F32.9 REACTIVE DEPRESSION: Status: ACTIVE | Noted: 2025-07-02

## 2025-07-02 PROBLEM — E11.628 TYPE 2 DIABETES MELLITUS WITH OTHER SKIN COMPLICATION: Status: ACTIVE | Noted: 2025-07-02

## 2025-07-02 PROCEDURE — G2211 COMPLEX E/M VISIT ADD ON: CPT

## 2025-07-02 PROCEDURE — 36415 COLL VENOUS BLD VENIPUNCTURE: CPT

## 2025-07-02 PROCEDURE — 99214 OFFICE O/P EST MOD 30 MIN: CPT

## 2025-07-03 ENCOUNTER — TRANSCRIPTION ENCOUNTER (OUTPATIENT)
Age: 75
End: 2025-07-03

## 2025-07-09 LAB
ANION GAP SERPL CALC-SCNC: 15 MMOL/L
BUN SERPL-MCNC: 12 MG/DL
CALCIUM SERPL-MCNC: 9.8 MG/DL
CHLORIDE SERPL-SCNC: 102 MMOL/L
CO2 SERPL-SCNC: 22 MMOL/L
CREAT SERPL-MCNC: 0.91 MG/DL
EGFRCR SERPLBLD CKD-EPI 2021: 67 ML/MIN/1.73M2
ESTIMATED AVERAGE GLUCOSE: 134 MG/DL
GLUCOSE SERPL-MCNC: 88 MG/DL
HBA1C MFR BLD HPLC: 6.3 %
POTASSIUM SERPL-SCNC: 4 MMOL/L
SODIUM SERPL-SCNC: 139 MMOL/L

## 2025-07-25 ENCOUNTER — TRANSCRIPTION ENCOUNTER (OUTPATIENT)
Age: 75
End: 2025-07-25

## 2025-08-13 ENCOUNTER — TRANSCRIPTION ENCOUNTER (OUTPATIENT)
Age: 75
End: 2025-08-13